# Patient Record
Sex: FEMALE | Race: WHITE | NOT HISPANIC OR LATINO | Employment: OTHER | URBAN - METROPOLITAN AREA
[De-identification: names, ages, dates, MRNs, and addresses within clinical notes are randomized per-mention and may not be internally consistent; named-entity substitution may affect disease eponyms.]

---

## 2017-02-13 ENCOUNTER — DOCUMENTATION ONLY (OUTPATIENT)
Dept: FAMILY MEDICINE | Facility: CLINIC | Age: 77
End: 2017-02-13

## 2017-02-13 NOTE — PROGRESS NOTES
Pre-Visit Chart Review  For Appointment Scheduled on 2/16/17.    Health Maintenance Due   Topic Date Due    Eye Exam  02/03/1950    TETANUS VACCINE  02/03/1960    DEXA SCAN  02/03/1982    Hemoglobin A1c  03/11/2010    Lipid Panel  07/30/2011    Influenza Vaccine  08/01/2016

## 2017-02-16 ENCOUNTER — LAB VISIT (OUTPATIENT)
Dept: LAB | Facility: HOSPITAL | Age: 77
End: 2017-02-16
Attending: FAMILY MEDICINE
Payer: MEDICARE

## 2017-02-16 ENCOUNTER — OFFICE VISIT (OUTPATIENT)
Dept: FAMILY MEDICINE | Facility: CLINIC | Age: 77
End: 2017-02-16
Payer: MEDICARE

## 2017-02-16 VITALS
BODY MASS INDEX: 34.17 KG/M2 | SYSTOLIC BLOOD PRESSURE: 144 MMHG | DIASTOLIC BLOOD PRESSURE: 88 MMHG | HEART RATE: 87 BPM | HEIGHT: 61 IN | WEIGHT: 181 LBS | TEMPERATURE: 98 F

## 2017-02-16 DIAGNOSIS — E11.59 HYPERTENSION ASSOCIATED WITH DIABETES: ICD-10-CM

## 2017-02-16 DIAGNOSIS — E11.69 HYPERLIPIDEMIA ASSOCIATED WITH TYPE 2 DIABETES MELLITUS: ICD-10-CM

## 2017-02-16 DIAGNOSIS — E66.9 OBESITY, CLASS II, BMI 35-39.9: ICD-10-CM

## 2017-02-16 DIAGNOSIS — E78.5 HYPERLIPIDEMIA ASSOCIATED WITH TYPE 2 DIABETES MELLITUS: ICD-10-CM

## 2017-02-16 DIAGNOSIS — I15.2 HYPERTENSION ASSOCIATED WITH DIABETES: ICD-10-CM

## 2017-02-16 DIAGNOSIS — E11.42 TYPE 2 DIABETES MELLITUS WITH POLYNEUROPATHY: Primary | ICD-10-CM

## 2017-02-16 DIAGNOSIS — G45.9 TRANSIENT CEREBRAL ISCHEMIA, UNSPECIFIED TYPE: ICD-10-CM

## 2017-02-16 PROCEDURE — 99999 PR PBB SHADOW E&M-EST. PATIENT-LVL V: CPT | Mod: PBBFAC,,, | Performed by: FAMILY MEDICINE

## 2017-02-16 PROCEDURE — 99214 OFFICE O/P EST MOD 30 MIN: CPT | Mod: S$PBB,,, | Performed by: FAMILY MEDICINE

## 2017-02-16 PROCEDURE — 36415 COLL VENOUS BLD VENIPUNCTURE: CPT | Mod: PO

## 2017-02-16 PROCEDURE — 83036 HEMOGLOBIN GLYCOSYLATED A1C: CPT

## 2017-02-16 RX ORDER — CARVEDILOL 12.5 MG/1
12.5 TABLET ORAL 2 TIMES DAILY WITH MEALS
COMMUNITY

## 2017-02-16 RX ORDER — AMLODIPINE BESYLATE 5 MG/1
5 TABLET ORAL DAILY
COMMUNITY

## 2017-02-16 RX ORDER — PRENATAL VIT 75/IRON/FOLIC/OM3 28-800-440
1 COMBINATION PACKAGE (EA) ORAL DAILY
COMMUNITY

## 2017-02-16 NOTE — PROGRESS NOTES
CHIEF COMPLAINT:  Follow up    HISTORY OF PRESENT ILLNESS:  Jonny Martinez is a 77 y.o. female who presents to clinic for follow up.  She is also established with Dr. Mcdermott, Dr. Arthur, Dr. Hart, and Dr. Larkin.    1. Type 2 DM:  She is on metformin, ASA. A recent HgA1c was 6.9%. She is not on a statin, but takes fish oil capsules. A urine microalbumin/cr rati was elevated.  She cannot be on an ACE-I, ARB due to hyperkalemia. She requests referral to diabetic education to discuss diabetic diet in the setting of hyperkalemia and GI issues. She states that her fasting blood sugars have been in the 110s-160s, the majority have been in the 130-140s.    2. HTN:  Established with Dr. Arthur.  She is on HCTZ, coreg, norvasc and prn catapres. She denies any CP, SOB, edema. She is following up with Dr. Arthur on monday      REVIEW OF SYSTEMS:  The patient denies any fever, chills, night sweats, headaches, vision changes, difficulty speaking or swallowing, decreased hearing, weight loss, weight gain, chest pain, palpitations, shortness of breath, cough, nausea, vomiting, abdominal pain, dysuria, diarrhea, constipation, hematuria, hematochezia, melena, changes in her hair, skin, nails, numbness or weakness in her extremities, erythema, pain or swelling over any of her joints, myalgia, swollen glands, easy bruising, fatigue, edema, symptoms of anxiety or depression. She denies any vaginal discharge, breast masses, nipple discharge, change in the skin overlying her breasts.      MEDICATIONS:   Reviewed and/or reconciled in EPIC    ALLERGIES:  Reviewed and/or reconciled in [facility]    PAST MEDICAL/SURGICAL HISTORY:   Past Medical History   Diagnosis Date    Arthritis     Diabetes mellitus, type 2     Diverticula of colon     Fibromyalgia     GERD (gastroesophageal reflux disease)     High cholesterol     IBS (irritable bowel syndrome)       Past Surgical History   Procedure Laterality Date    Hysterectomy    "   Cholecystectomy      Appendectomy      Anal fissure repair      Tonsillectomy         FAMILY HISTORY:    Family History   Problem Relation Age of Onset    Aortic aneurysm Father     Cancer Mother      cervical    Diabetes Maternal Uncle     Allergic rhinitis Neg Hx     Angioedema Neg Hx     Asthma Neg Hx     Atopy Neg Hx     Eczema Neg Hx     Immunodeficiency Neg Hx     Urticaria Neg Hx     Breast cancer Neg Hx     Colon cancer Neg Hx     Ovarian cancer Neg Hx     Hypertension Neg Hx        SOCIAL HISTORY:    Social History     Social History    Marital status:      Spouse name: N/A    Number of children: N/A    Years of education: N/A     Occupational History    Not on file.     Social History Main Topics    Smoking status: Former Smoker     Packs/day: 0.20     Years: 5.00     Types: Cigarettes    Smokeless tobacco: Never Used      Comment: smoked 1 pack per week for about 5 years in 20s    Alcohol use No    Drug use: No    Sexual activity: Not Currently     Partners: Male     Other Topics Concern    Not on file     Social History Narrative       PHYSICAL EXAM:  VITAL SIGNS:   Vitals:    02/16/17 1437 02/16/17 1453   BP: (!) 144/82 (!) 144/88   BP Location: Left arm Right arm   Patient Position: Sitting Sitting   BP Method: Automatic Manual   Pulse: 87    Temp: 98.3 °F (36.8 °C)    TempSrc: Oral    Weight: 82.1 kg (181 lb)    Height: 5' 1" (1.549 m)      GENERAL:  Patient appears well nourished, sitting on exam table, in no acute distress.  HEENT:  Atraumatic, normocephalic, PERRLA, EOMI, no conjunctival injection, sclerae are anicteric, normal external auditory canals,TMs clear b/l, gross hearing intact to whisper, MMM, no oropharygneal erythema or exudate.  NECK:  Supple, normal ROM, trachea is midline , no supraclavicular or cervical LAD or masses palpated.  Thyroid gland not palpable.  CARDIOVASCULAR:  RRR, normal S1 and S2, no m/r/g.  RESPIRATORY:  CTA b/l, no wheezes, " rhonchi, rales.  No increased work of breathing, no  use of accessory muscles.  ABDOMEN:  Soft, nontender, nondistended, normoactive bowel sounds in all four quadrants, no rebound or guarding, no HSM or masses palpated.  Normal percussion.  EXTREMITIES:  2+ DP pulses b/l, no edema.  SKIN:  Warm, no lesions on exposed skin.  NEUROMUSCULAR:  Cranial nerves II-XII grossly intact. No clubbing or cyanosis of digits/nails.  Steady gait.  PSYCH:  Patient is alert and oriented to person, time, place. They are appropriately dressed and groomed. There is normal eye contact. Rate and tone of speech is normal. Normal insight, judgement. Normal thought content and process.     LABORATORY/IMAGING STUDIES: pending    ASSESSMENT/PLAN: This is a 77 y.o. female who presents to clinic for evaluation of the following concerns  1. Type  2 DM: see below  2. HTN: see below  3. Obesity: see below        Patient readiness: acceptance and barriers:none    During the course of the visit the patient was educated and counseled about the following:     Diabetes:  HgA1c, refer to diabetic education.  Hypertension:   Medication: no change. follow up with Dr. Arthur next week.   Obesity:   General weight loss/lifestyle modification strategies discussed (elicit support from others; identify saboteurs; non-food rewards, etc).  Diet interventions: moderate (500 kCal/d) deficit diet.  Informal exercise measures discussed, e.g. taking stairs instead of elevator.    Goals: Diabetes: Maintain Hemoglobin A1C below 7, Hypertension: Reduce Blood Pressure and Obesity: Reduce calorie intake and BMI    Did patient meet goals/outcomes: No    The following self management tools provided: declined    Patient Instructions (the written plan) was given to the patient/family.     Time spent with patient: 55 minutes      Adriana Izaguirre MD

## 2017-02-16 NOTE — MR AVS SNAPSHOT
[facility]  [address]  [place] [address]  Phone: [phone]  Fax: [phone]                  Jonny Martinez   2017 2:40 PM   Office Visit    Description:  Female : 1940   Provider:  Adriana Izaguirre MD   Department:  [facility]           Reason for Visit     Follow-up           Diagnoses this Visit        Comments    Type 2 diabetes mellitus with polyneuropathy    -  Primary     Hypertension associated with diabetes         Hyperlipidemia associated with type 2 diabetes mellitus         Obesity, Class II, BMI 35-39.9         Transient cerebral ischemia, unspecified type                To Do List           Future Appointments        Provider Department Dept Phone    2017 4:00 PM [facility] [place] [phone]    3/16/2017 2:00 PM [facility] [place] [facility] [phone]    2017 1:00 PM Adriana Izaguirre MD [facility] [phone]      Goals (5 Years of Data)     None      Follow-Up and Disposition     Return in about 6 months (around 2017) for type 2 DM.      [facility] On Call     [facility] On Call Nurse Care Line -  Assistance  Registered nurses in the [facility] On Call Center provide clinical advisement, health education, appointment booking, and other advisory services.  Call for this free service at [phone].             Medications           Message regarding Medications     Verify the changes and/or additions to your medication regime listed below are the same as discussed with your clinician today.  If any of these changes or additions are incorrect, please notify your healthcare provider.        STOP taking these medications     multivitamin (ONE DAILY MULTIVITAMIN) per tablet Take 1 tablet by mouth once daily.           Verify that the below list of medications is an accurate representation of the medications you are currently taking.  If none reported, the list  may be blank. If incorrect, please contact your healthcare provider. Carry this list with you in case of emergency.           Current Medications     acai berry extract 500 mg Cap Take 1 capsule by mouth once daily.    amlodipine (NORVASC) 5 MG tablet Take 5 mg by mouth once daily.    ascorbic acid (VITAMIN C) 1000 MG tablet Take 1,000 mg by mouth once daily.    aspirin (ECOTRIN) 81 MG EC tablet Take 81 mg by mouth once daily.    CALCIUM-MAGNESIUM ORAL Take 1 tablet by mouth once daily.    carvedilol (COREG) 12.5 MG tablet Take 12.5 mg by mouth 2 (two) times daily with meals.    cholecalciferol, vitamin D3, 5,000 unit Tab Take 5,000 Units by mouth once daily.    cloNIDine (CATAPRES) 0.1 MG tablet Take 1 tablet every 12 hours as needed     coenzyme Q10 10 mg capsule Take 10 mg by mouth once daily.    dicyclomine (BENTYL) 20 mg tablet Four times a day    FREESTYLE LANCETS 28 gauge lancets     GRAPE SEED EXTRACT (GRAPE SEED ORAL) Take 325 mg by mouth once daily.    GREEN TEA EXTRACT ORAL Take 1 tablet by mouth once daily.    hydrochlorothiazide (HYDRODIURIL) 25 MG tablet Take 25 mg by mouth once daily.    hydrocodone-acetaminophen 10-325mg (NORCO)  mg Tab Take by mouth once daily. Take 1/2 to 1 tablet daily if needed.     LACTOBACILLUS ACIDOPHILUS (PROBIOTIC ORAL) Take 1 capsule by mouth once daily.    metformin (GLUCOPHAGE) 1000 MG tablet Take 1,000 mg by mouth 2 (two) times daily with meals.     milk thistle 200 mg Cap Take 1 capsule by mouth once daily.    olive leaf extract 250 mg Cap Take 1 capsule by mouth once daily.    OMEGA-3 FATTY ACIDS/FISH OIL (OMEGA 3 FISH OIL ORAL) No Sig Provided    ONE TOUCH ULTRA TEST Strp     pantoprazole (PROTONIX) 40 MG tablet Take 40 mg by mouth once daily.     RESTASIS 0.05 % ophthalmic emulsion     resveratrol 250 mg Cap Take 1 capsule by mouth once daily.           Clinical Reference Information           Your Vitals Were     BP Pulse Temp Height Weight BMI    144/88 (BP  "Location: Right arm, Patient Position: Sitting, BP Method: Manual) 87 98.3 °F (36.8 °C) (Oral) 5' 1" (1.549 m) 82.1 kg (181 lb) 34.2 kg/m2      Blood Pressure          Most Recent Value    BP  (!)  144/88      Allergies as of 2/16/2017     Losartan    Monosodium Glutamate    Niacin    Penicillins      Immunizations Administered on Date of Encounter - 2/16/2017     None      Orders Placed During Today's Visit      Normal Orders This Visit    Ambulatory consult to Diabetic Education     Future Labs/Procedures Expected by Expires    Hemoglobin A1c  2/16/2017 4/17/2018      Language Assistance Services     ATTENTION: Language assistance services are available, free of charge. Please call [phone].      ATENCIÓN: Si royce nichols, tiene a barkley disposición servicios gratuitos de asistencia lingüística. Llame al [phone].     CHÚ Ý: N?u b?n nói Ti?ng Vi?t, có các d?ch v? h? tr? ngôn ng? mi?n phí dành cho b?n. G?i s? [phone].         [facility] complies with applicable Federal civil rights laws and does not discriminate on the basis of race, color, national origin, age, disability, or sex.        "

## 2017-02-17 DIAGNOSIS — E11.9 TYPE 2 DIABETES MELLITUS WITHOUT COMPLICATION: ICD-10-CM

## 2017-02-17 LAB
ESTIMATED AVG GLUCOSE: 143 MG/DL
HBA1C MFR BLD HPLC: 6.6 %

## 2017-03-16 ENCOUNTER — CLINICAL SUPPORT (OUTPATIENT)
Dept: DIABETES | Facility: CLINIC | Age: 77
End: 2017-03-16
Payer: MEDICARE

## 2017-03-16 DIAGNOSIS — E11.42 TYPE 2 DIABETES MELLITUS WITH POLYNEUROPATHY: ICD-10-CM

## 2017-03-16 PROCEDURE — G0108 DIAB MANAGE TRN  PER INDIV: HCPCS | Mod: PBBFAC,PO | Performed by: DIETITIAN, REGISTERED

## 2017-03-16 PROCEDURE — 99999 PR PBB SHADOW E&M-EST. PATIENT-LVL III: CPT | Mod: PBBFAC,,,

## 2017-03-16 PROCEDURE — 99213 OFFICE O/P EST LOW 20 MIN: CPT | Mod: PBBFAC,PO

## 2017-03-20 VITALS — BODY MASS INDEX: 34.17 KG/M2 | WEIGHT: 181 LBS | HEIGHT: 61 IN

## 2017-03-20 NOTE — PROGRESS NOTES
03/16/17 0000   Diabetes Education Visit   Diabetes Education Record Assessment/Progress Initial   Diabetes Type   Diabetes Type  Type II   Diabetes History   Diabetes Diagnosis 1-3 years   Nutrition   Meal Planning snacks between meal;skipping meals;water   Meal Plan 24 Hour Recall - Breakfast (eggs and toast or eggs and grits )   Meal Plan 24 Hour Recall - Lunch (Waits till hungry to eat and will eat whatever she may have in house)   Meal Plan 24 Hour Recall - Dinner (Last night neighbor brought leftovers but she could not remember what she had)   Monitoring    Monitoring Free Lite   Self Monitoring  (Reports blood sugar fasting range )   Blood Glucose Logs No   Exercise    Exercise Type (No routine exercise)   Current Diabetes Treatment    Current Treatment Oral Medication  (1000 mg Metformin BID)   Social History   Preferred Learning Method Face to Face   Primary Support Self;Family   Educational Level College Graduate   Smoking Status Ex Smoker   Alcohol Use Never   Barriers to Change   Barriers to Change None   Learning Challenges  None   Readiness to Learn    Readiness to Learn  Acceptance   Diabetes Education Assessment/Progress   Acute Complications (preventing, detecting, and treating acute complications) DC   Chronic Complications (preventing, detecting, and treating chronic complications) DC   Diabetes Disease Process (diabetes disease process and treatment options) DC   Nutrition (Incorporating nutritional management into one's lifestyle) DC;W Educated patient on plate method with carb limit set to 30 grams per meal and 15 grams or less per snack.  Educated patient on how to read nutrition labels for carb, protein, fiber and fat content.  Provided Carb counting and meal planning book for reference. Instructed patient on the importance of eating three times per day.   Physical Activity (incorporating physical activity into one's lifestyle) DC   Medications (states correct name, dose, onset,  peak, duration, side effects & timing of meds) DC   Monitoring (monitoring blood glucose/other parameters & using results) DC   Goal Setting and Problem Solving (verbalizes behavior change strategies & sets realistic goals) DC   Behavior Change (developing personal strategies to health & behavior change) DC   Psychosocial Issues (developing personal srategies to address psychosocial concerns) DC   Goals   Healthy Eating Set   Start Date 03/16/17   Monitoring In Progress   Medications In Progress   Problem Solving In Progress   Healthy Coping In Progress   Reducing Risks In Progress   Diabetes Self-Management Support Plan   Exercise/Nutrition (Carb couting and meal planning book provided)   Stress Management friends   Review Status Patient has selected and agrees to support plan.   Diabetes Care Plan/Intervention   Education Plan/Intervention In F/U DSMT   Diabetes Meal Plan   Calories 1400   Carbohydrate Per Meal 30-45g   Carbohydrate Per Snack  7-15g   Protein (lean protein with meals)   Education Units of Time    Time Spent 60 min

## 2017-07-01 LAB
BASOPHILS # BLD AUTO: 77 CELLS/UL (ref 0–200)
BASOPHILS NFR BLD AUTO: 0.5 %
EOSINOPHIL # BLD AUTO: 413 CELLS/UL (ref 15–500)
EOSINOPHIL NFR BLD AUTO: 2.7 %
ERYTHROCYTE [DISTWIDTH] IN BLOOD BY AUTOMATED COUNT: 20.5 % (ref 11–15)
HCT VFR BLD AUTO: 48.7 % (ref 35–45)
HGB BLD-MCNC: 14.6 G/DL (ref 11.7–15.5)
LYMPHOCYTES # BLD AUTO: 2020 CELLS/UL (ref 850–3900)
LYMPHOCYTES NFR BLD AUTO: 13.2 %
MCH RBC QN AUTO: 23.1 PG (ref 27–33)
MCHC RBC AUTO-ENTMCNC: 30.1 G/DL (ref 32–36)
MCV RBC AUTO: 76.7 FL (ref 80–100)
MONOCYTES # BLD AUTO: 551 CELLS/UL (ref 200–950)
MONOCYTES NFR BLD AUTO: 3.6 %
NEUTROPHILS # BLD AUTO: ABNORMAL CELLS/UL (ref 1500–7800)
NEUTROPHILS NFR BLD AUTO: 80 %
PLATELET # BLD AUTO: 728 THOUSAND/UL (ref 140–400)
PMV BLD REES-ECKER: 8.5 FL (ref 7.5–12.5)
RBC # BLD AUTO: 6.34 MILLION/UL (ref 3.8–5.1)
WBC # BLD AUTO: 15.3 THOUSAND/UL (ref 3.8–10.8)

## 2017-07-12 ENCOUNTER — TELEPHONE (OUTPATIENT)
Dept: OBSTETRICS AND GYNECOLOGY | Facility: CLINIC | Age: 77
End: 2017-07-12

## 2017-07-12 NOTE — TELEPHONE ENCOUNTER
----- Message from Genesis Mckenzie sent at 7/11/2017  4:49 PM CDT -----  Contact: Jonny  Patient is scheduled for WWE 11/6/17 but as Medicare stipulates every two years but states Dr Jones told her she'd see her in a year. States has history in family of ovarian cancer. Please call [phone] or can send message via [employer] to let know either way about appointment. Thanks!

## 2017-07-12 NOTE — TELEPHONE ENCOUNTER
Spoke with patient and informed her that she is not due until 11/2018 for a WWE. She denies any personal history of cancer, had a hysterectomy due to fibroids, and is not on any hormonal supplements. She was informed that she does not meet criteria for annual visits, and she may be billed the full amount of the visit, She wants to keep her appt as scheduled. She was informed that she will need to sign an ABN at the time of her visit, should she choose to keep it. She verbalized understanding./zoë

## 2017-07-13 DIAGNOSIS — M79.641 BILATERAL HAND PAIN: Primary | ICD-10-CM

## 2017-07-13 DIAGNOSIS — M79.642 BILATERAL HAND PAIN: Primary | ICD-10-CM

## 2017-07-14 ENCOUNTER — TELEPHONE (OUTPATIENT)
Dept: ORTHOPEDICS | Facility: CLINIC | Age: 77
End: 2017-07-14

## 2017-07-14 NOTE — TELEPHONE ENCOUNTER
Returned pt's call, she was curious about if she should see ortho first or rheumatologist.  Advised that it was her choice.  She stated that she will keep scheduled appt next week.

## 2017-07-14 NOTE — TELEPHONE ENCOUNTER
----- Message from Kathryn Wilks sent at 7/14/2017 12:56 PM CDT -----  Contact: self   [phone]  Patient called asking is she should see the orthopedic doctor first for bilateral hand pain. Or should the patient see the rheumatologist first. Thanks!

## 2017-07-18 ENCOUNTER — OFFICE VISIT (OUTPATIENT)
Dept: ORTHOPEDICS | Facility: CLINIC | Age: 77
End: 2017-07-18
Payer: MEDICARE

## 2017-07-18 ENCOUNTER — HOSPITAL ENCOUNTER (OUTPATIENT)
Dept: RADIOLOGY | Facility: HOSPITAL | Age: 77
Discharge: HOME | End: 2017-07-18
Attending: ORTHOPAEDIC SURGERY
Payer: MEDICARE

## 2017-07-18 VITALS
HEART RATE: 82 BPM | WEIGHT: 181 LBS | BODY MASS INDEX: 34.17 KG/M2 | SYSTOLIC BLOOD PRESSURE: 132 MMHG | HEIGHT: 61 IN | DIASTOLIC BLOOD PRESSURE: 69 MMHG

## 2017-07-18 DIAGNOSIS — M18.0 PRIMARY OSTEOARTHRITIS OF BOTH FIRST CARPOMETACARPAL JOINTS: Primary | ICD-10-CM

## 2017-07-18 DIAGNOSIS — M79.642 BILATERAL HAND PAIN: ICD-10-CM

## 2017-07-18 DIAGNOSIS — M79.641 BILATERAL HAND PAIN: ICD-10-CM

## 2017-07-18 PROCEDURE — 1159F MED LIST DOCD IN RCRD: CPT | Mod: ,,, | Performed by: ORTHOPAEDIC SURGERY

## 2017-07-18 PROCEDURE — 73130 X-RAY EXAM OF HAND: CPT | Mod: 26,50,, | Performed by: RADIOLOGY

## 2017-07-18 PROCEDURE — 99999 PR PBB SHADOW E&M-EST. PATIENT-LVL III: CPT | Mod: PBBFAC,,, | Performed by: ORTHOPAEDIC SURGERY

## 2017-07-18 PROCEDURE — 1125F AMNT PAIN NOTED PAIN PRSNT: CPT | Mod: ,,, | Performed by: ORTHOPAEDIC SURGERY

## 2017-07-18 PROCEDURE — 73130 X-RAY EXAM OF HAND: CPT | Mod: 50,TC,PN

## 2017-07-18 PROCEDURE — 99203 OFFICE O/P NEW LOW 30 MIN: CPT | Mod: S$PBB,,, | Performed by: ORTHOPAEDIC SURGERY

## 2017-07-18 NOTE — PROGRESS NOTES
Past Medical History:   Diagnosis Date    Arthritis     Diabetes mellitus, type 2     Diverticula of colon     Fibromyalgia     GERD (gastroesophageal reflux disease)     High cholesterol     IBS (irritable bowel syndrome)        Past Surgical History:   Procedure Laterality Date    anal fissure repair      APPENDECTOMY      CHOLECYSTECTOMY      HYSTERECTOMY      TONSILLECTOMY         Current Outpatient Prescriptions   Medication Sig    acai berry extract 500 mg Cap Take 1 capsule by mouth once daily.    amlodipine (NORVASC) 5 MG tablet Take 5 mg by mouth once daily.    ascorbic acid (VITAMIN C) 1000 MG tablet Take 1,000 mg by mouth once daily.    aspirin (ECOTRIN) 81 MG EC tablet Take 81 mg by mouth once daily.    carvedilol (COREG) 12.5 MG tablet Take 12.5 mg by mouth 2 (two) times daily with meals.    cholecalciferol, vitamin D3, 5,000 unit Tab Take 5,000 Units by mouth once daily.    cloNIDine (CATAPRES) 0.1 MG tablet Take 1 tablet every 12 hours as needed     coenzyme Q10 10 mg capsule Take 10 mg by mouth once daily.    dicyclomine (BENTYL) 20 mg tablet Four times a day    FREESTYLE LANCETS 28 gauge lancets     GRAPE SEED EXTRACT (GRAPE SEED ORAL) Take 325 mg by mouth once daily.    GREEN TEA EXTRACT ORAL Take 1 tablet by mouth once daily.    hydrochlorothiazide (HYDRODIURIL) 25 MG tablet Take 25 mg by mouth once daily.    hydrocodone-acetaminophen 10-325mg (NORCO)  mg Tab Take by mouth once daily. Take 1/2 to 1 tablet daily if needed.     LACTOBACILLUS ACIDOPHILUS (PROBIOTIC ORAL) Take 1 capsule by mouth once daily.    metformin (GLUCOPHAGE) 1000 MG tablet Take 1,000 mg by mouth 2 (two) times daily with meals.     milk thistle 200 mg Cap Take 1 capsule by mouth once daily.    olive leaf extract 250 mg Cap Take 1 capsule by mouth once daily.    OMEGA-3 FATTY ACIDS/FISH OIL (OMEGA 3 FISH OIL ORAL) No Sig Provided    ONE TOUCH ULTRA TEST Strp     pantoprazole (PROTONIX) 40  "MG tablet Take 40 mg by mouth once daily.     resveratrol 250 mg Cap Take 1 capsule by mouth once daily.    CALCIUM-MAGNESIUM ORAL Take 1 tablet by mouth once daily.    RESTASIS 0.05 % ophthalmic emulsion      No current facility-administered medications for this visit.        Review of patient's allergies indicates:   Allergen Reactions    Losartan      Made patient "feel weird"    Monosodium glutamate      Other reaction(s): flushing skin  Other reaction(s): elev b/p    Niacin      Other reaction(s): flushing skin  Other reaction(s): elev b/p    Penicillins      Other reaction(s): whelps       Family History   Problem Relation Age of Onset    Aortic aneurysm Father     Cancer Mother      cervical    Diabetes Maternal Uncle     Allergic rhinitis Neg Hx     Angioedema Neg Hx     Asthma Neg Hx     Atopy Neg Hx     Eczema Neg Hx     Immunodeficiency Neg Hx     Urticaria Neg Hx     Breast cancer Neg Hx     Colon cancer Neg Hx     Ovarian cancer Neg Hx     Hypertension Neg Hx        Social History     Social History    Marital status:      Spouse name: N/A    Number of children: N/A    Years of education: N/A     Occupational History    Not on file.     Social History Main Topics    Smoking status: Former Smoker     Packs/day: 0.20     Years: 5.00     Types: Cigarettes    Smokeless tobacco: Never Used      Comment: smoked 1 pack per week for about 5 years in 20s    Alcohol use No    Drug use: No    Sexual activity: Not Currently     Partners: Male     Other Topics Concern    Not on file     Social History Narrative    No narrative on file       Chief Complaint:   Chief Complaint   Patient presents with    Hand Pain     BILATERAL HAND PAIN       Consulting Physician: No ref. provider found    History of present illness:    This is a 77 y.o. year old female who complains of lateral thumb pain that she's had for years.  She has previously been seen by rheumatologist.  She puts her " "pain at a 5 out of 10 and worse with activities.  She has trouble gripping and driving.    Review of Systems:    Constitution: Denies chills, fever, and sweats.  HENT: Denies headaches or blurry vision.  Cardiovascular: Denies chest pain or irregular heart beat.  Respiratory: Denies cough or shortness of breath.  Gastrointestinal: Denies abdominal pain, nausea, or vomiting.  Musculoskeletal:  Denies muscle cramps.  Neurological: Denies dizziness or focal weakness.  Psychiatric/Behavioral: Normal mental status.  Hematologic/Lymphatic: Denies bleeding problem or easy bruising/bleeding.  Skin: Denies rash or suspicious lesions.    Examination:    Vital Signs:    Vitals:    07/18/17 1347   BP: 132/69   Pulse: 82   Weight: 82.1 kg (181 lb)   Height: 5' 1" (1.549 m)   PainSc:   5   PainLoc: Hand       Body mass index is 34.2 kg/m².    This a well-developed, well nourished patient in no acute distress.    Alert and oriented and cooperative to examination.       Physical Exam: Right Hand Exam    Skin  Scars:   None  Rash:   None    Inspection  Erythema:  None  Bruising:  None  Swelling:  None  Masses:  None  Lymphadenopathy: None    Coordination:  Normal  Instability:  None    Range of Motion  Finger ROM:  Full    Strength:  Normal   Tenderness:  CMC thumb    Pulse:   2+ radial  Capillary Refill: Normal    Sensation:  Intact      Left Hand Exam    Skin  Scars:   None  Rash:   None    Inspection  Erythema:  None  Bruising:  None  Swelling:  None  Masses:  None  Lymphadenopathy: None    Coordination:  Normal  Instability:  None    Range of Motion  Finger ROM:  Full    Strength:  Normal   Tenderness:  CMC thumb    Pulse:   2+ radial  Capillary Refill: Normal    Sensation:  Intact          Imaging: X-rays ordered and reviewed today both hands reveal degenerative changes throughout the hands with severe arthritis at the CMC joint of both thumbs.        Assessment: Primary osteoarthritis of both first carpometacarpal " joints        Plan:  We discussed treatment options with her today.  She is not interested in an injection.  She has an appointment with a rheumatologist next week.  We did give her night splints to try to give her some pain relief.  We also set her up with a referral to see our hand surgeon for possible CMC arthroplasty.      DISCLAIMER: This note may have been dictated using voice recognition software and may contain grammatical errors.     NOTE: Consult report sent to referring provider via [email or web address] EMR.

## 2017-08-07 ENCOUNTER — OFFICE VISIT (OUTPATIENT)
Dept: PODIATRY | Facility: CLINIC | Age: 77
End: 2017-08-07
Payer: MEDICARE

## 2017-08-07 VITALS — WEIGHT: 180.75 LBS | HEIGHT: 61 IN | BODY MASS INDEX: 34.13 KG/M2

## 2017-08-07 DIAGNOSIS — Z01.818 PRE-OP TESTING: ICD-10-CM

## 2017-08-07 DIAGNOSIS — E11.42 DIABETIC POLYNEUROPATHY ASSOCIATED WITH TYPE 2 DIABETES MELLITUS: Primary | ICD-10-CM

## 2017-08-07 DIAGNOSIS — B35.1 ONYCHOMYCOSIS DUE TO DERMATOPHYTE: ICD-10-CM

## 2017-08-07 DIAGNOSIS — L60.0 INGROWN NAIL: ICD-10-CM

## 2017-08-07 DIAGNOSIS — M79.674 TOE PAIN, BILATERAL: ICD-10-CM

## 2017-08-07 DIAGNOSIS — M79.675 TOE PAIN, BILATERAL: ICD-10-CM

## 2017-08-07 PROCEDURE — 1159F MED LIST DOCD IN RCRD: CPT | Mod: ,,, | Performed by: PODIATRIST

## 2017-08-07 PROCEDURE — 99213 OFFICE O/P EST LOW 20 MIN: CPT | Mod: PBBFAC,PO | Performed by: PODIATRIST

## 2017-08-07 PROCEDURE — 99999 PR PBB SHADOW E&M-EST. PATIENT-LVL III: CPT | Mod: PBBFAC,,, | Performed by: PODIATRIST

## 2017-08-07 PROCEDURE — 3044F HG A1C LEVEL LT 7.0%: CPT | Mod: ,,, | Performed by: PODIATRIST

## 2017-08-07 PROCEDURE — 1125F AMNT PAIN NOTED PAIN PRSNT: CPT | Mod: ,,, | Performed by: PODIATRIST

## 2017-08-07 PROCEDURE — 99213 OFFICE O/P EST LOW 20 MIN: CPT | Mod: S$PBB,,, | Performed by: PODIATRIST

## 2017-08-07 NOTE — PROGRESS NOTES
Subjective:      Patient ID: Jonny Martinez is a 77 y.o. female.    Chief Complaint: Diabetic Foot Exam and Nail Problem    Sharp pain both sides and proximal fold both hallux toenails.  Gradual onset, worsening over past several weeks, aggravated by increased weight bearing, shoe gear, pressure.  No previous medical treatment.  OTC pain med not helping.  Denies signs infection and recent trauma both big toes.    Diabetes, increased risk amputation needing evaluation/management/optomization of foot care.       Review of Systems   Constitution: Negative for chills, diaphoresis, fever, malaise/fatigue and night sweats.   Cardiovascular: Negative for claudication, cyanosis, leg swelling and syncope.   Skin: Positive for nail changes. Negative for color change, dry skin, rash, suspicious lesions and unusual hair distribution.   Musculoskeletal: Negative for falls, joint pain, joint swelling, muscle cramps, muscle weakness and stiffness.   Gastrointestinal: Negative for constipation, diarrhea, nausea and vomiting.   Neurological: Negative for brief paralysis, disturbances in coordination, focal weakness, numbness, paresthesias, sensory change and tremors.           Objective:      Physical Exam   Constitutional: She is oriented to person, place, and time. She appears well-developed and well-nourished. She is cooperative.   Oriented to time, place, and person.   Cardiovascular:   Pulses:       Dorsalis pedis pulses are 1+ on the right side, and 1+ on the left side.        Posterior tibial pulses are 1+ on the right side, and 1+ on the left side.   Capillary fill time 3-5 seconds.  All toes warm to touch.      Negative lower extremity edema bilateral.    Negative elevational pallor and dependent rubor bilateral.     Musculoskeletal:   Normal angle, base, station of gait. Decreased stride length, early heel off, moderately propulsive toe off bilateral.    All ten toes without clubbing, cyanosis, or signs of ischemia.       No pain to palpation bilateral lower extremities.      Range of motion, stability, muscle strength, and muscle tone are age and health appropriate normal bilateral feet and legs.       Lymphadenopathy:   Negative lymphadenopathy bilateral popliteal fossa and tarsal tunnel.     Neurological: She is alert and oriented to person, place, and time. She has normal strength. She is not disoriented. She displays no atrophy and no tremor. A sensory deficit is present. She exhibits normal muscle tone.   Reflex Scores:       Patellar reflexes are 2+ on the right side and 2+ on the left side.       Achilles reflexes are 2+ on the right side and 2+ on the left side.  Decreased/absent vibratory sensation bilateral feet to 128Hz tuning fork.     Skin: Skin is warm, dry and intact. No abrasion, no bruising, no burn, no ecchymosis, no laceration, no lesion, no petechiae and no rash noted. She is not diaphoretic. No cyanosis or erythema. No pallor. Nails show no clubbing.   Skin thin, atrophic, with decreased density and distribution of pedal hair bilateral, but without hyperpigmentation, dheeraj discoloration,  ulcers, masses, nodules or cords palpated bilateral feet and legs.      Toenails 1st, 2nd, 3rd, 4th, 5th  bilateral are hypertrophic thickened 2-3 mm, dystrophic, discolored tanish brown with tan, gray crumbly subungual debris.  Tender to distal nail plate pressure, without periungual skin abnormality of each.    Both borders and proximal nailfold both hallux mild erythema  without ulceration, drainage, pus, tracking, fluctuance, malodor, or cardinal signs infection.               Assessment:       Encounter Diagnoses   Name Primary?    Diabetic polyneuropathy associated with type 2 diabetes mellitus Yes    Onychomycosis due to dermatophyte     Toe pain, bilateral     Pre-op testing     Ingrown nail          Plan:       Jonny was seen today for diabetic foot exam and nail problem.    Diagnoses and all orders for  this visit:    Diabetic polyneuropathy associated with type 2 diabetes mellitus  -     US Lower Extrem Arteries Bilat with KESHA; Future    Onychomycosis due to dermatophyte  -     US Lower Extrem Arteries Bilat with KESHA; Future    Toe pain, bilateral  -     US Lower Extrem Arteries Bilat with KESHA; Future    Pre-op testing  -     US Lower Extrem Arteries Bilat with KESHA; Future    Ingrown nail      I counseled the patient on her conditions, their implications and medical management.    Needs A1c, arterial dopplers.  If acceptable, rtc 2 weeks both hallux total matrixectomy.    Discussed conservative treatment with shoes of adequate dimensions, material, and style to alleviate symptoms and delay or prevent surgical intervention.    The patient has received literature on basic diabetic foot care.  Patient will inspect feet daily, wear protective shoe gear when ambulatory, and apply moisturizer to skin as needed to maintain elasticity and help prevent ulceration.            Return in about 2 weeks (around 8/21/2017) for matrixectomy total both hallux.

## 2017-08-08 ENCOUNTER — HOSPITAL ENCOUNTER (OUTPATIENT)
Dept: RADIOLOGY | Facility: CLINIC | Age: 77
Discharge: HOME | End: 2017-08-08
Attending: PODIATRIST
Payer: MEDICARE

## 2017-08-08 DIAGNOSIS — M79.675 TOE PAIN, BILATERAL: ICD-10-CM

## 2017-08-08 DIAGNOSIS — M79.674 TOE PAIN, BILATERAL: ICD-10-CM

## 2017-08-08 DIAGNOSIS — B35.1 ONYCHOMYCOSIS DUE TO DERMATOPHYTE: ICD-10-CM

## 2017-08-08 DIAGNOSIS — Z01.818 PRE-OP TESTING: ICD-10-CM

## 2017-08-08 DIAGNOSIS — E11.42 DIABETIC POLYNEUROPATHY ASSOCIATED WITH TYPE 2 DIABETES MELLITUS: ICD-10-CM

## 2017-08-08 PROCEDURE — 93925 LOWER EXTREMITY STUDY: CPT | Mod: 26,,, | Performed by: RADIOLOGY

## 2017-08-08 PROCEDURE — 93922 UPR/L XTREMITY ART 2 LEVELS: CPT | Mod: 26,,, | Performed by: RADIOLOGY

## 2017-08-08 PROCEDURE — 93925 LOWER EXTREMITY STUDY: CPT | Mod: TC,PO

## 2017-08-09 ENCOUNTER — CLINICAL SUPPORT (OUTPATIENT)
Dept: AUDIOLOGY | Facility: CLINIC | Age: 77
End: 2017-08-09
Payer: MEDICARE

## 2017-08-09 ENCOUNTER — OFFICE VISIT (OUTPATIENT)
Dept: OTOLARYNGOLOGY | Facility: CLINIC | Age: 77
End: 2017-08-09
Payer: MEDICARE

## 2017-08-09 VITALS
HEART RATE: 82 BPM | HEIGHT: 62 IN | SYSTOLIC BLOOD PRESSURE: 135 MMHG | WEIGHT: 183 LBS | DIASTOLIC BLOOD PRESSURE: 86 MMHG | BODY MASS INDEX: 33.68 KG/M2

## 2017-08-09 DIAGNOSIS — H90.3 BILATERAL SENSORINEURAL HEARING LOSS: Primary | ICD-10-CM

## 2017-08-09 DIAGNOSIS — J34.89 NASAL OBSTRUCTION: Primary | ICD-10-CM

## 2017-08-09 DIAGNOSIS — J34.3 HYPERTROPHY OF INFERIOR NASAL TURBINATE: ICD-10-CM

## 2017-08-09 DIAGNOSIS — H90.3 BILATERAL SENSORINEURAL HEARING LOSS: ICD-10-CM

## 2017-08-09 DIAGNOSIS — J34.2 NASAL SEPTAL DEVIATION: ICD-10-CM

## 2017-08-09 DIAGNOSIS — J31.0 OTHER CHRONIC RHINITIS: ICD-10-CM

## 2017-08-09 DIAGNOSIS — R42 DIZZINESS: ICD-10-CM

## 2017-08-09 DIAGNOSIS — R42 DIZZY: Primary | ICD-10-CM

## 2017-08-09 DIAGNOSIS — H93.13 BILATERAL TINNITUS: ICD-10-CM

## 2017-08-09 PROCEDURE — 92557 COMPREHENSIVE HEARING TEST: CPT | Mod: PBBFAC,PO | Performed by: AUDIOLOGIST

## 2017-08-09 PROCEDURE — 92567 TYMPANOMETRY: CPT | Mod: PBBFAC,PO | Performed by: AUDIOLOGIST

## 2017-08-09 PROCEDURE — 3008F BODY MASS INDEX DOCD: CPT | Mod: ,,, | Performed by: OTOLARYNGOLOGY

## 2017-08-09 PROCEDURE — 1159F MED LIST DOCD IN RCRD: CPT | Mod: ,,, | Performed by: OTOLARYNGOLOGY

## 2017-08-09 PROCEDURE — 1126F AMNT PAIN NOTED NONE PRSNT: CPT | Mod: ,,, | Performed by: OTOLARYNGOLOGY

## 2017-08-09 PROCEDURE — 3075F SYST BP GE 130 - 139MM HG: CPT | Mod: ,,, | Performed by: OTOLARYNGOLOGY

## 2017-08-09 PROCEDURE — 99999 PR PBB SHADOW E&M-EST. PATIENT-LVL I: CPT | Mod: PBBFAC,,,

## 2017-08-09 PROCEDURE — 99999 PR PBB SHADOW E&M-EST. PATIENT-LVL III: CPT | Mod: PBBFAC,,, | Performed by: OTOLARYNGOLOGY

## 2017-08-09 PROCEDURE — 3079F DIAST BP 80-89 MM HG: CPT | Mod: ,,, | Performed by: OTOLARYNGOLOGY

## 2017-08-09 PROCEDURE — 99211 OFF/OP EST MAY X REQ PHY/QHP: CPT | Mod: PBBFAC,PO

## 2017-08-09 PROCEDURE — 31231 NASAL ENDOSCOPY DX: CPT | Mod: S$PBB,,, | Performed by: OTOLARYNGOLOGY

## 2017-08-09 PROCEDURE — 99204 OFFICE O/P NEW MOD 45 MIN: CPT | Mod: 25,S$PBB,, | Performed by: OTOLARYNGOLOGY

## 2017-08-09 RX ORDER — FLUTICASONE PROPIONATE 50 MCG
SPRAY, SUSPENSION (ML) NASAL
Qty: 48 G | Refills: 3 | Status: SHIPPED | OUTPATIENT
Start: 2017-08-09 | End: 2017-11-07

## 2017-08-09 RX ORDER — FLUTICASONE PROPIONATE 50 MCG
2 SPRAY, SUSPENSION (ML) NASAL DAILY
Qty: 1 BOTTLE | Refills: 12 | Status: SHIPPED | OUTPATIENT
Start: 2017-08-09 | End: 2017-08-09 | Stop reason: SDUPTHER

## 2017-08-09 NOTE — PROGRESS NOTES
Francisco Trejo and audiogram performed per order from  Dr. Brennan Salter.     Jonny Martinez was seen 2017 for a Lagrange [place] and an audiological evaluation per order from Dr. Brennan Salter.     Patient complained of dizziness, hearing loss and tinnitus. She stated that her mother wore hearing aid but she is unsure of hearing loss among her other immediate family members (no longer in contact with and/or ). Pt stated that her dizziness is motion provoked and that she feels it when she looks up towards the ceiling or moves her head in different positions.  She sleeps on her sofa and lays at a 45 degree angle (or higher) on her left side due to problems with her breathing resulting from the high placement of her diaphragm.  She stated that she experience significant difficulty breathing when laying flat on her back.     Negative Akilah AU. (Lagrange performed in exam chair in ENT exam room with patient's consent after informing her that any difficulty in her breathing would lead to us stopping the procedure immediately.)     Audiogram results revealed a mild to moderately-severe sensorineural hearing loss bilaterally.  Speech Reception Thresholds were  40 dBHL for each ear and word recognition scores were good (88%) for each ear.   Tympanograms were Type As bilaterally.    Audiogram results were reviewed in detail with patient and all questions were answered. Results will be reviewed by ENT at the completion of this note.    Recommend binaural amplification pending medical clearance and annual audiogram to monitor hearing loss.  Pt stated that she is not interested in hearing aids at this time as she is not particularly bothered by her hearing loss. She stated that she currently uses an amplified telephone and can turn the TV up loud enough to hear comfortably.

## 2017-08-09 NOTE — PATIENT INSTRUCTIONS
Nasal steroid spray  You have been prescribed or instructed to take a nasal steroid spray.     Use as directed, spraying 1-2 times in each nostril per day.   Helpful hints for maximizing medication into the nose    - Use the opposite hand to spray the nostril (example: right hand for left nostril). This will help avoid spraying the medication onto the septum (the area that divides the left and right nasal cavity.)  - Tilt the bottle so that it is facing at a slight angle up or straight back, but avoid pointing the bottle straight up while spraying.   - Sniff in while you are spraying.    Side effects:  Overall, this is a well tolerated medication with low side effects. The benefit of nasal steroids as opposed to oral steroids is that the nasal steroid works primarily in the nose.  Common side effects: headache, nasal dryness, minor nose bleeds,   Rare side effects: septal perforation, elevated eye pressures, dry eyes, change in smell, allergic reaction.  Notify your doctor if you have any concerns or experience these symptoms.    Nasal Irrigations  This will help remove the allergens, debris, and mucous from your nose to help you breathe. It will also clear it in preparation for other nasal medications.    To perform, purchase an over the counter sinus irrigation kit such as the [employer] Sinus Rinse Kit. Use as directed on the box. You should use distilled water or water that was previously boiled and left to cool. If you wish, you may make your own solution. However, salt packets are available in the nasal section in your  drug store.     A rough estimate for making salt solution is:  8oz water  2 teaspoons salt (pickling, berry or Kosher salt)  1 teaspoon baking soda    After each use, rinse the bottle with small amount of rubbing alcohol and clean with soap.  Replace the irrigation bottle if it becomes visibly soiled or every few weeks.        You are a hearing aid candidate if you decide.   You should have a  follow-up of an appointment with an audiogram in 1 year.

## 2017-08-09 NOTE — PROGRESS NOTES
Subjective:       Patient ID: Jonny Martinez is a 77 y.o. female.    Chief Complaint: Sinus Problem and Nasal Congestion      Jonny is here for nasal obstruction. Symptoms have been present for years. Congestion mainly left sided. There has been fluctuation with season but her allergy testing was negative. Itchy eyes / watery. Nose does not run. Her main complaint is congestion. [facility] She has tried Ocean spray with no relief. She was recommended to have sinus rinses and did not buy it. She does use O2 at night for desaturations - PFTs have been normal? She does not know why she needs oxygen. Her PSG was reportedly normal. She has not tried another medication. She has difficulty laying flat.     She endorses hearing loss but has not had an audio in years. She also endorses dizziness when she looks up or lays flat. Denies nausea/vomiting.    Smoking status: Former Smoker                                                              Packs/day: 0.20      Years: 5.00         Types: Cigarettes  Smokeless tobacco: Never Used                      Comment: smoked 1 pack per week for about 5 years in            20s    Alcohol use: No                      Review of Systems   Constitutional: Negative for activity change and appetite change.   Eyes: Negative for discharge.   Respiratory: Negative for apnea.    Cardiovascular: Negative for chest pain.   Gastrointestinal: Negative for abdominal distention and abdominal pain.   Endocrine: Negative for cold intolerance and heat intolerance.   Genitourinary: Negative for dysuria.   Musculoskeletal: Negative for gait problem and joint swelling.   Skin: Negative for color change and pallor.   Neurological: Negative for syncope and weakness.   Psychiatric/Behavioral: Negative for agitation and confusion.         Objective:        Constitutional:   She is oriented to person, place, and time. She appears well-developed and  well-nourished. She appears alert. She is active. Normal speech.      Head:  Normocephalic and atraumatic. Head is without TMJ tenderness. No scars. Salivary glands normal.  Facial strength is normal.      Ears:    Right Ear: No drainage or swelling. No middle ear effusion.   Left Ear: No drainage or swelling.  No middle ear effusion.     Nose:  Mucosal edema present. No sinus tenderness. Rhinorrhea: scant  No turbinate hypertrophy.      Mouth/Throat  Oropharynx clear and moist without lesions or asymmetry, normal uvula midline and mirror exam normal. Normal dentition. No uvula swelling, lacerations or trismus. No oropharyngeal exudate. Tonsillar erythema, tonsillar exudate.      Neck:  Full range of motion with neck supple and no adenopathy. Thyroid tenderness is present. No tracheal deviation, no edema, no erythema, normal range of motion, no stridor, no crepitus and no neck rigidity present. No thyroid mass present.     Cardiovascular:   Intact distal pulses and normal pulses.      Pulmonary/Chest:   Effort normal and breath sounds normal. No stridor.     Psychiatric:   Her speech is normal and behavior is normal. Her mood appears not anxious. Her affect is not labile.     Neurological:   She is alert and oriented to person, place, and time. No sensory deficit.     Skin:   No abrasions, lacerations, lesions, or rashes. No abrasion and no bruising noted.         Tests / Results:  Audiogram Results 08/09/2017  (personally reviewed by me and discussed with patient)  Right ear  PTA:42 SRT:40 WRS:88   Left ear  PTA: 42 SRT: 40 WRS:88  Summary: flat moderate SNHL bilaterally      Tympanograms: As AU    Name: Jonny Martinez     Pre-procedure diagnosis: Nasal obstruction [J34.89]  Post-procedure diagnosis: Same    Procedure: Bilateral nasal endoscopy  Anesthesia:  2% Lidocaine and 4% Oxymetazoline topical    Procedure: Risks, benefits, and alternatives of the procedure were discussed with the patient, and consent  was obtained to perform a nasal endoscopy.  The nasal cavity was sprayed with a topical decongestant and topical anesthetic. After adequate anesthesia was obtained, the scope was passed into each nostril independently.  The nasal cavities, nasopharynx, choana, eustachian tube, fossa of Rosenmüller, and adenoids were examined with the findings described below. At the end of the examination, the scope was removed. The patient tolerated the procedure well with no complications.     Findings:  -     Nasal septum: LEFT severe deviation   -     Inferior turbinate: hypertrophy or edema (Moderate) bilaterally  -     Middle meatus: LEFT: clear   RIGHT: clear  -     Adenoids have no hypertrophy  -     There is no stenosis of the choana,  eustachian tube, or nasopharynx  -     Fossa of Rosenmüller is symmetric and contains no mass  -     Inflamed mucosa with thin stranding mucous      Assessment:       1. Nasal obstruction    2. Nasal septal deviation    3. Hypertrophy of inferior nasal turbinate          Plan:         Discussed Flonase and Nasal saline irrigations. I think this may help calm down her nose and improve some of her nasal congestion. She likely has chronic non-allergic rhinitis given her negative allergy profile in 2015.    She has a severe septal deviation and turbinate hypertrophy. She also has other medical issues including oxygen dependence at night and inability to lie flat.    If medication does not improve her symptoms, she would be a candidate for septoplasty and turbinate reduction if she wanted to be more aggressive. She would need medical clearance may need to be done at hospital given her oxygen dependence at night.      Her Bradley-Hallpike was negative for BPPV. She has a bilateral moderate SNHL, we discussed she is a candidate for hearing aids but she is deferring at this time. Recommended hearing protection and yearly audiogram.

## 2017-08-10 ENCOUNTER — TELEPHONE (OUTPATIENT)
Dept: FAMILY MEDICINE | Facility: CLINIC | Age: 77
End: 2017-08-10

## 2017-08-10 NOTE — TELEPHONE ENCOUNTER
Called pt. Need to reschedule appt with Dr. Izaguirre on 8/18/17. Dr. Izaguirre is not in clinic that day. Rescheduled to 9/20/17 with Dr. Izaguirre. Pt refused appt with ANGELLA sooner. Thanks, Carolina

## 2017-08-21 ENCOUNTER — TELEPHONE (OUTPATIENT)
Dept: PODIATRY | Facility: CLINIC | Age: 77
End: 2017-08-21

## 2017-08-21 DIAGNOSIS — E78.5 HYPERLIPIDEMIA ASSOCIATED WITH TYPE 2 DIABETES MELLITUS: ICD-10-CM

## 2017-08-21 DIAGNOSIS — E11.59 HYPERTENSION ASSOCIATED WITH DIABETES: ICD-10-CM

## 2017-08-21 DIAGNOSIS — E11.42 TYPE 2 DIABETES MELLITUS WITH POLYNEUROPATHY: Primary | ICD-10-CM

## 2017-08-21 DIAGNOSIS — I15.2 HYPERTENSION ASSOCIATED WITH DIABETES: ICD-10-CM

## 2017-08-21 DIAGNOSIS — E11.69 HYPERLIPIDEMIA ASSOCIATED WITH TYPE 2 DIABETES MELLITUS: ICD-10-CM

## 2017-08-21 DIAGNOSIS — Z01.818 PRE-OP TESTING: ICD-10-CM

## 2017-08-21 NOTE — TELEPHONE ENCOUNTER
----- Message from Cecilia Mendoza sent at 8/21/2017  3:25 PM CDT -----  Contact: Pt  Pt is requesting to have a callback from Dr. Parkinson. Pt has questions about having an order to have a A1C test done before she comes in for her appt on 08/23. Pls call pt back at [phone].

## 2017-08-21 NOTE — TELEPHONE ENCOUNTER
Spoke with Patient, Dr Izaguirre rescheduled her annual exam until 09/20/2017. She will not be getting her HGBA1c until then, do you want to order her HGBA1C. She will need it before she gets her Nail procedure done.

## 2017-08-22 NOTE — TELEPHONE ENCOUNTER
CMP, lipid panel, urine microalbumin/cr and Hga1c ordered. Need to be fasting please schedule so that Dr. Parkinson can proceed with her procedure after the Hga1c is resulted. She then needs to keep her follow up visit.

## 2017-08-22 NOTE — TELEPHONE ENCOUNTER
Spoke with  Juan, she will contact Dr Izaguirre to see about getting the HGbA1C done sooner that her next appointment so that she can get her procedure next week.

## 2017-08-24 ENCOUNTER — TELEPHONE (OUTPATIENT)
Dept: FAMILY MEDICINE | Facility: CLINIC | Age: 77
End: 2017-08-24

## 2017-08-24 NOTE — TELEPHONE ENCOUNTER
----- Message from Cherrie Baker sent at 8/24/2017  1:27 PM CDT -----  Asking for a1c test done... She is having surgery on her toes / Dr Parkinson is waiting for the results ... Call pt at [phone] (home)

## 2017-08-24 NOTE — TELEPHONE ENCOUNTER
Spoke to patient only wants A1c drawn patient declined to schedule the rest of her labs and urine.

## 2017-08-25 ENCOUNTER — LAB VISIT (OUTPATIENT)
Dept: LAB | Facility: HOSPITAL | Age: 77
End: 2017-08-25
Attending: FAMILY MEDICINE
Payer: MEDICARE

## 2017-08-25 DIAGNOSIS — I15.2 HYPERTENSION ASSOCIATED WITH DIABETES: ICD-10-CM

## 2017-08-25 DIAGNOSIS — E78.5 HYPERLIPIDEMIA ASSOCIATED WITH TYPE 2 DIABETES MELLITUS: ICD-10-CM

## 2017-08-25 DIAGNOSIS — E11.69 HYPERLIPIDEMIA ASSOCIATED WITH TYPE 2 DIABETES MELLITUS: ICD-10-CM

## 2017-08-25 DIAGNOSIS — E11.42 TYPE 2 DIABETES MELLITUS WITH POLYNEUROPATHY: ICD-10-CM

## 2017-08-25 DIAGNOSIS — E11.59 HYPERTENSION ASSOCIATED WITH DIABETES: ICD-10-CM

## 2017-08-25 LAB
ESTIMATED AVG GLUCOSE: 143 MG/DL
HBA1C MFR BLD HPLC: 6.6 %

## 2017-08-25 PROCEDURE — 36415 COLL VENOUS BLD VENIPUNCTURE: CPT | Mod: PO

## 2017-08-25 PROCEDURE — 83036 HEMOGLOBIN GLYCOSYLATED A1C: CPT

## 2017-09-01 LAB
BASOPHILS # BLD AUTO: 207 CELLS/UL (ref 0–200)
BASOPHILS NFR BLD AUTO: 1.3 %
EOSINOPHIL # BLD AUTO: 509 CELLS/UL (ref 15–500)
EOSINOPHIL NFR BLD AUTO: 3.2 %
ERYTHROCYTE [DISTWIDTH] IN BLOOD BY AUTOMATED COUNT: 19.7 % (ref 11–15)
HCT VFR BLD AUTO: 50.1 % (ref 35–45)
HGB BLD-MCNC: 15.3 G/DL (ref 11.7–15.5)
LYMPHOCYTES # BLD AUTO: 2019 CELLS/UL (ref 850–3900)
LYMPHOCYTES NFR BLD AUTO: 12.7 %
MCH RBC QN AUTO: 23.5 PG (ref 27–33)
MCHC RBC AUTO-ENTMCNC: 30.5 G/DL (ref 32–36)
MCV RBC AUTO: 77 FL (ref 80–100)
MONOCYTES # BLD AUTO: 731 CELLS/UL (ref 200–950)
MONOCYTES NFR BLD AUTO: 4.6 %
NEUTROPHILS # BLD AUTO: ABNORMAL CELLS/UL (ref 1500–7800)
NEUTROPHILS NFR BLD AUTO: 78.2 %
PLATELET # BLD AUTO: 799 THOUSAND/UL (ref 140–400)
PMV BLD REES-ECKER: 9.8 FL (ref 7.5–12.5)
RBC # BLD AUTO: 6.51 MILLION/UL (ref 3.8–5.1)
WBC # BLD AUTO: 15.9 THOUSAND/UL (ref 3.8–10.8)

## 2017-09-03 ENCOUNTER — TELEPHONE (OUTPATIENT)
Dept: HEMATOLOGY/ONCOLOGY | Facility: CLINIC | Age: 77
End: 2017-09-03

## 2017-09-11 ENCOUNTER — DOCUMENTATION ONLY (OUTPATIENT)
Dept: FAMILY MEDICINE | Facility: CLINIC | Age: 77
End: 2017-09-11

## 2017-09-11 NOTE — PROGRESS NOTES
Pre-Visit Chart Review  For Appointment Scheduled on 9/20/17.    Health Maintenance Due   Topic Date Due    TETANUS VACCINE  02/03/1958    DEXA SCAN  03/30/2017    Influenza Vaccine  08/01/2017    Urine Microalbumin  09/18/2017

## 2017-09-13 ENCOUNTER — OFFICE VISIT (OUTPATIENT)
Dept: PODIATRY | Facility: CLINIC | Age: 77
End: 2017-09-13
Payer: MEDICARE

## 2017-09-13 VITALS — WEIGHT: 187.63 LBS | BODY MASS INDEX: 34.53 KG/M2 | HEIGHT: 62 IN

## 2017-09-13 DIAGNOSIS — L60.0 INGROWN NAIL: ICD-10-CM

## 2017-09-13 DIAGNOSIS — M79.675 TOE PAIN, BILATERAL: ICD-10-CM

## 2017-09-13 DIAGNOSIS — B35.1 ONYCHOMYCOSIS DUE TO DERMATOPHYTE: Primary | ICD-10-CM

## 2017-09-13 DIAGNOSIS — M79.674 TOE PAIN, BILATERAL: ICD-10-CM

## 2017-09-13 PROCEDURE — 11750 EXCISION NAIL&NAIL MATRIX: CPT | Mod: 50,PBBFAC,PO | Performed by: PODIATRIST

## 2017-09-13 PROCEDURE — 99499 UNLISTED E&M SERVICE: CPT | Mod: S$PBB,,, | Performed by: PODIATRIST

## 2017-09-13 PROCEDURE — 11750 EXCISION NAIL&NAIL MATRIX: CPT | Mod: 51,TA,S$PBB, | Performed by: PODIATRIST

## 2017-09-13 PROCEDURE — 99213 OFFICE O/P EST LOW 20 MIN: CPT | Mod: PBBFAC,PO | Performed by: PODIATRIST

## 2017-09-13 PROCEDURE — 99999 PR PBB SHADOW E&M-EST. PATIENT-LVL III: CPT | Mod: PBBFAC,,, | Performed by: PODIATRIST

## 2017-09-13 RX ORDER — TOBRAMYCIN 3 MG/ML
SOLUTION/ DROPS OPHTHALMIC
Qty: 5 ML | Refills: 3 | Status: SHIPPED | OUTPATIENT
Start: 2017-09-13 | End: 2018-03-07

## 2017-09-13 NOTE — PROGRESS NOTES
Subjective:      Patient ID: Jonny Martinez is a 77 y.o. female.    Chief Complaint: nail procedure    Sharp pain both sides and proximal fold both hallux toenails.  Gradual onset, worsening over past several weeks, aggravated by increased weight bearing, shoe gear, pressure.  No previous medical treatment.  OTC pain med not helping.  Denies signs infection and recent trauma both big toes.    A1c and vascular dopplers reasonable expectation of healing.      Review of Systems   Constitution: Negative for chills, diaphoresis, fever, malaise/fatigue and night sweats.   Cardiovascular: Negative for claudication, cyanosis, leg swelling and syncope.   Skin: Positive for nail changes. Negative for color change, dry skin, rash, suspicious lesions and unusual hair distribution.   Musculoskeletal: Negative for falls, joint pain, joint swelling, muscle cramps, muscle weakness and stiffness.   Gastrointestinal: Negative for constipation, diarrhea, nausea and vomiting.   Neurological: Negative for brief paralysis, disturbances in coordination, focal weakness, numbness, paresthesias, sensory change and tremors.           Objective:      Physical Exam   Constitutional: She is oriented to person, place, and time. She appears well-developed and well-nourished. She is cooperative.   Oriented to time, place, and person.   Cardiovascular:   Pulses:       Dorsalis pedis pulses are 1+ on the right side, and 1+ on the left side.        Posterior tibial pulses are 1+ on the right side, and 1+ on the left side.   Capillary fill time 3-5 seconds.  All toes warm to touch.      Negative lower extremity edema bilateral.    Negative elevational pallor and dependent rubor bilateral.     Musculoskeletal:   Normal angle, base, station of gait. Decreased stride length, early heel off, moderately propulsive toe off bilateral.    All ten toes without clubbing, cyanosis, or signs of ischemia.      No pain to palpation bilateral lower extremities.       Range of motion, stability, muscle strength, and muscle tone are age and health appropriate normal bilateral feet and legs.       Lymphadenopathy:   Negative lymphadenopathy bilateral popliteal fossa and tarsal tunnel.     Neurological: She is alert and oriented to person, place, and time. She has normal strength. She is not disoriented. She displays no atrophy and no tremor. A sensory deficit is present. She exhibits normal muscle tone.   Reflex Scores:       Patellar reflexes are 2+ on the right side and 2+ on the left side.       Achilles reflexes are 2+ on the right side and 2+ on the left side.  Decreased/absent vibratory sensation bilateral feet to 128Hz tuning fork.     Skin: Skin is warm, dry and intact. No abrasion, no bruising, no burn, no ecchymosis, no laceration, no lesion, no petechiae and no rash noted. She is not diaphoretic. No cyanosis or erythema. No pallor. Nails show no clubbing.   Skin thin, atrophic, with decreased density and distribution of pedal hair bilateral, but without hyperpigmentation, dheeraj discoloration,  ulcers, masses, nodules or cords palpated bilateral feet and legs.      Toenails 1st, 2nd, 3rd, 4th, 5th  bilateral are hypertrophic thickened 2-3 mm, dystrophic, discolored tanish brown with tan, gray crumbly subungual debris.  Tender to distal nail plate pressure, without periungual skin abnormality of each.    Both borders and proximal nailfold both hallux mild erythema  without ulceration, drainage, pus, tracking, fluctuance, malodor, or cardinal signs infection.               Assessment:       Encounter Diagnoses   Name Primary?    Onychomycosis due to dermatophyte Yes    Toe pain, bilateral     Ingrown nail          Plan:       Jonny was seen today for nail procedure.    Diagnoses and all orders for this visit:    Onychomycosis due to dermatophyte    Toe pain, bilateral    Ingrown nail      I counseled the patient on her conditions, their implications and medical  management.      PREOPERATIVE DIAGNOSIS Ingrown toenail, both borders of the Bilateral 1st.    POSTOPERATIVE DIAGNOSIS:Same.     NAME OF THE PROCEDURE: Permanent matrixectomy, total nail matrix right and left hallux  SURGEON: Dr. Rosas Parkinson.   No surgical assist.     ESTIMATED BLOOD LOSS: Minimal, being less than 1 mL.     HEMOSTASIS: Anatomic dissection, direct pressure manually.     ANESTHESIA: 1% lidocaine plain.     PROCEDURE IN DETAIL: After that time-out procedure and all the patient   identifiers and site markings being in agreement, I anesthetized the surgical toe(s) with a total of 3 mL of 1% lidocaine plain per digit. After verifying anesthesia, I   used a [place] Elevator to completely remove both the right and left hallux toenail from all soft tissue attachments discarding in red bag medical waste.  Now, the matrix of the entire right and left hallux toenails was permanently destroyed with three consecutive 30-second   applications of 90% phenol, which was then neutralized with isopropyl alcohol   and rinsed with sterile normal saline, blotted dry and dressed with a thin layer   of antibiotic cream and a dry sterile dressing of 4 x 4s, Kirby and light   compression with Coban.     The patient was dispensed a surgical shoe today both fet  and a prescription for tobramycin drops 0.3% for twice daily application to the   wound and keep it covered at all times. Follow in this office in two weeks for   reevaluation, sooner p.r.n. if he experiences fever, chills, night sweats,   nausea, vomiting, redness, pain out of proportion, drainage, pus or malodor of the surgical toe.              Return in about 2 weeks (around 9/27/2017).

## 2017-09-15 DIAGNOSIS — Z78.0 ASYMPTOMATIC MENOPAUSAL STATE: Primary | ICD-10-CM

## 2017-09-20 ENCOUNTER — OFFICE VISIT (OUTPATIENT)
Dept: FAMILY MEDICINE | Facility: CLINIC | Age: 77
End: 2017-09-20
Payer: MEDICARE

## 2017-09-20 VITALS
HEIGHT: 62 IN | TEMPERATURE: 98 F | DIASTOLIC BLOOD PRESSURE: 68 MMHG | BODY MASS INDEX: 34.69 KG/M2 | SYSTOLIC BLOOD PRESSURE: 101 MMHG | HEART RATE: 90 BPM | WEIGHT: 188.5 LBS

## 2017-09-20 DIAGNOSIS — E11.42 TYPE 2 DIABETES MELLITUS WITH POLYNEUROPATHY: Primary | ICD-10-CM

## 2017-09-20 DIAGNOSIS — Z23 IMMUNIZATION DUE: ICD-10-CM

## 2017-09-20 DIAGNOSIS — I15.2 HYPERTENSION ASSOCIATED WITH DIABETES: ICD-10-CM

## 2017-09-20 DIAGNOSIS — R80.9 MICROALBUMINURIA DUE TO TYPE 2 DIABETES MELLITUS: ICD-10-CM

## 2017-09-20 DIAGNOSIS — R14.0 ABDOMINAL BLOATING: ICD-10-CM

## 2017-09-20 DIAGNOSIS — E11.69 HYPERLIPIDEMIA ASSOCIATED WITH TYPE 2 DIABETES MELLITUS: ICD-10-CM

## 2017-09-20 DIAGNOSIS — E11.59 HYPERTENSION ASSOCIATED WITH DIABETES: ICD-10-CM

## 2017-09-20 DIAGNOSIS — E78.5 HYPERLIPIDEMIA ASSOCIATED WITH TYPE 2 DIABETES MELLITUS: ICD-10-CM

## 2017-09-20 DIAGNOSIS — E11.29 MICROALBUMINURIA DUE TO TYPE 2 DIABETES MELLITUS: ICD-10-CM

## 2017-09-20 PROBLEM — Z99.81 OXYGEN DEPENDENT: Status: ACTIVE | Noted: 2017-09-20

## 2017-09-20 PROCEDURE — 3074F SYST BP LT 130 MM HG: CPT | Mod: ,,, | Performed by: FAMILY MEDICINE

## 2017-09-20 PROCEDURE — 99214 OFFICE O/P EST MOD 30 MIN: CPT | Mod: 25,S$PBB,, | Performed by: FAMILY MEDICINE

## 2017-09-20 PROCEDURE — 3078F DIAST BP <80 MM HG: CPT | Mod: ,,, | Performed by: FAMILY MEDICINE

## 2017-09-20 PROCEDURE — G0008 ADMIN INFLUENZA VIRUS VAC: HCPCS | Mod: PBBFAC,PO

## 2017-09-20 PROCEDURE — 1159F MED LIST DOCD IN RCRD: CPT | Mod: ,,, | Performed by: FAMILY MEDICINE

## 2017-09-20 PROCEDURE — 99999 PR PBB SHADOW E&M-EST. PATIENT-LVL V: CPT | Mod: PBBFAC,,, | Performed by: FAMILY MEDICINE

## 2017-09-20 PROCEDURE — 99215 OFFICE O/P EST HI 40 MIN: CPT | Mod: PBBFAC,PO | Performed by: FAMILY MEDICINE

## 2017-09-20 PROCEDURE — 1125F AMNT PAIN NOTED PAIN PRSNT: CPT | Mod: ,,, | Performed by: FAMILY MEDICINE

## 2017-09-20 RX ORDER — HYDROCODONE BITARTRATE AND ACETAMINOPHEN 10; 325 MG/1; MG/1
TABLET ORAL
Qty: 30 TABLET | Refills: 0 | Status: SHIPPED | OUTPATIENT
Start: 2017-09-20

## 2017-09-20 NOTE — PATIENT INSTRUCTIONS
Hold metformin for 2 days after the CT scan. Then have BMP in 2 days and if normal can restart metformin

## 2017-09-20 NOTE — PROGRESS NOTES
CHIEF COMPLAINT:  Follow up    HISTORY OF PRESENT ILLNESS:  Jonny Martinez is a 77 y.o. female who presents to clinic for follow up.  She is also established with Dr. Mcdermott, Dr. Arthur, Dr. Hart, and Dr. Larkin.    1. Type 2 DM:  She is on metformin, ASA. A recent HgA1c was 6.6%. She is not on a statin, but takes fish oil capsules. A urine microalbumin/cr rati was elevated.  She cannot be on an ACE-I, ARB due to hyperkalemia.  She states that her fasting blood sugars have been in the 110s-160s, the majority have been in the 130-140s.    2. HTN:  Established with Dr. Arthur.  She is on HCTZ, coreg, norvasc and prn catapres. She denies any CP, SOB, edema. She is following up with Dr. Arthur on Friday    3. She is due for an influenza vaccine    4. She has noticed severe abdominal bloating, she has followed up with Dr. Hart but sattes that she has not had any recent abdominal imaging.    REVIEW OF SYSTEMS:  The patient denies any fever, chills, night sweats, headaches, vision changes, difficulty speaking or swallowing, decreased hearing, weight loss, weight gain, chest pain, palpitations, shortness of breath, cough, nausea, vomiting, abdominal pain, dysuria, diarrhea, constipation, hematuria, hematochezia, melena, changes in her hair, skin, nails, numbness or weakness in her extremities, erythema, pain or swelling over any of her joints, myalgia, swollen glands, easy bruising, fatigue, edema, symptoms of anxiety or depression.      MEDICATIONS:   Reviewed and/or reconciled in EPIC    ALLERGIES:  Reviewed and/or reconciled in [facility]    PAST MEDICAL/SURGICAL HISTORY:   Past Medical History:   Diagnosis Date    Arthritis     Diabetes mellitus, type 2     Diverticula of colon     Fibromyalgia     GERD (gastroesophageal reflux disease)     High cholesterol     IBS (irritable bowel syndrome)       Past Surgical History:   Procedure Laterality Date    anal fissure repair      APPENDECTOMY       "CHOLECYSTECTOMY      HYSTERECTOMY      TONSILLECTOMY         FAMILY HISTORY:    Family History   Problem Relation Age of Onset    Aortic aneurysm Father     Cancer Mother      cervical    Diabetes Maternal Uncle     Allergic rhinitis Neg Hx     Angioedema Neg Hx     Asthma Neg Hx     Atopy Neg Hx     Eczema Neg Hx     Immunodeficiency Neg Hx     Urticaria Neg Hx     Breast cancer Neg Hx     Colon cancer Neg Hx     Ovarian cancer Neg Hx     Hypertension Neg Hx        SOCIAL HISTORY:    Social History     Social History    Marital status:      Spouse name: N/A    Number of children: N/A    Years of education: N/A     Occupational History    Not on file.     Social History Main Topics    Smoking status: Former Smoker     Packs/day: 0.20     Years: 5.00     Types: Cigarettes    Smokeless tobacco: Never Used      Comment: smoked 1 pack per week for about 5 years in 20s    Alcohol use No    Drug use: No    Sexual activity: Not Currently     Partners: Male     Other Topics Concern    Not on file     Social History Narrative    No narrative on file       PHYSICAL EXAM:  VITAL SIGNS:   Vitals:    09/20/17 1628   BP: 101/68   BP Location: Left arm   Patient Position: Sitting   BP Method: Small (Automatic)   Pulse: 90   Temp: 98.3 °F (36.8 °C)   TempSrc: Oral   Weight: 85.5 kg (188 lb 7.9 oz)   Height: 5' 1.5" (1.562 m)     GENERAL:  Patient appears well nourished, sitting on exam table, in no acute distress.  HEENT:  Atraumatic, normocephalic, PERRLA, EOMI, no conjunctival injection, sclerae are anicteric, normal external auditory canals,TMs clear b/l, gross hearing intact to whisper, MMM, no oropharygneal erythema or exudate.  NECK:  Supple, normal ROM, trachea is midline , no supraclavicular or cervical LAD or masses palpated.  Thyroid gland not palpable.  CARDIOVASCULAR:  RRR, normal S1 and S2, no m/r/g.  RESPIRATORY:  CTA b/l, no wheezes, rhonchi, rales.  No increased work of breathing, " no  use of accessory muscles.  ABDOMEN:  Soft, nontender, nondistended, normoactive bowel sounds in all four quadrants, no rebound or guarding, no HSM or masses palpated.  Normal percussion.  EXTREMITIES:  2+ DP pulses b/l, no edema.  SKIN:  Warm, no lesions on exposed skin.  NEUROMUSCULAR:  Cranial nerves II-XII grossly intact. No clubbing or cyanosis of digits/nails.  Steady gait.  PSYCH:  Patient is alert and oriented to person, time, place. They are appropriately dressed and groomed. There is normal eye contact. Rate and tone of speech is normal. Normal insight, judgement. Normal thought content and process.     LABORATORY/IMAGING STUDIES: pending    ASSESSMENT/PLAN: This is a 77 y.o. female who presents to clinic for evaluation of the following concerns  1. Type  2 DM: see below  2. HTN: see below  3. Obesity: see below  4. Hyperlipidemia: follow up with cardiology  5. Immunization due: will receive influenza vaccine in clinic today  6. Abdominal bloating: obtain CT of abdomen and pelvis    Patient readiness: acceptance and barriers:none    During the course of the visit the patient was educated and counseled about the following:     Diabetes:  At goal, continue with current medications.   Hypertension:   Medication: no change. follow up with Dr. Arthur next week.   Obesity:   General weight loss/lifestyle modification strategies discussed (elicit support from others; identify saboteurs; non-food rewards, etc).  Diet interventions: moderate (500 kCal/d) deficit diet.  Informal exercise measures discussed, e.g. taking stairs instead of elevator.    Goals: Diabetes: Maintain Hemoglobin A1C below 7, Hypertension: Reduce Blood Pressure and Obesity: Reduce calorie intake and BMI    Did patient meet goals/outcomes: No    The following self management tools provided: declined    Patient Instructions (the written plan) was given to the patient/family.     Time spent with patient: 55 minutes      Adriana Izaguirre MD

## 2017-09-27 ENCOUNTER — OFFICE VISIT (OUTPATIENT)
Dept: PODIATRY | Facility: CLINIC | Age: 77
End: 2017-09-27
Payer: MEDICARE

## 2017-09-27 VITALS — HEIGHT: 62 IN | WEIGHT: 191.13 LBS | BODY MASS INDEX: 35.17 KG/M2

## 2017-09-27 DIAGNOSIS — S91.109D WOUND, OPEN, TOE, SUBSEQUENT ENCOUNTER: Primary | ICD-10-CM

## 2017-09-27 PROCEDURE — 1159F MED LIST DOCD IN RCRD: CPT | Mod: ,,, | Performed by: PODIATRIST

## 2017-09-27 PROCEDURE — 99999 PR PBB SHADOW E&M-EST. PATIENT-LVL III: CPT | Mod: PBBFAC,,, | Performed by: PODIATRIST

## 2017-09-27 PROCEDURE — 99213 OFFICE O/P EST LOW 20 MIN: CPT | Mod: PBBFAC,PO | Performed by: PODIATRIST

## 2017-09-27 PROCEDURE — 1126F AMNT PAIN NOTED NONE PRSNT: CPT | Mod: ,,, | Performed by: PODIATRIST

## 2017-09-27 PROCEDURE — 99212 OFFICE O/P EST SF 10 MIN: CPT | Mod: S$PBB,,, | Performed by: PODIATRIST

## 2017-09-27 NOTE — PROGRESS NOTES
Subjective:      Patient ID: Jonny Martinez is a 77 y.o. female.    Chief Complaint: Post-op Evaluation (2 week (great nail) )    2 weeks s/p matrixectomy both hallux total.  Covering all times with band aid dressings and topical antibiotics    A1c and vascular dopplers reasonable expectation of healing.      Review of Systems   Constitution: Negative for chills, diaphoresis, fever, malaise/fatigue and night sweats.   Cardiovascular: Negative for claudication, cyanosis, leg swelling and syncope.   Skin: Positive for nail changes. Negative for color change, dry skin, rash, suspicious lesions and unusual hair distribution.   Musculoskeletal: Negative for falls, joint pain, joint swelling, muscle cramps, muscle weakness and stiffness.   Gastrointestinal: Negative for constipation, diarrhea, nausea and vomiting.   Neurological: Negative for brief paralysis, disturbances in coordination, focal weakness, numbness, paresthesias, sensory change and tremors.           Objective:      Physical Exam   Constitutional: She is oriented to person, place, and time. She appears well-developed and well-nourished. She is cooperative.   Oriented to time, place, and person.   Cardiovascular:   Pulses:       Dorsalis pedis pulses are 1+ on the right side, and 1+ on the left side.        Posterior tibial pulses are 1+ on the right side, and 1+ on the left side.   Capillary fill time 3-5 seconds.  All toes warm to touch.      Negative lower extremity edema bilateral.    Negative elevational pallor and dependent rubor bilateral.     Musculoskeletal:   Normal angle, base, station of gait. Decreased stride length, early heel off, moderately propulsive toe off bilateral.    All ten toes without clubbing, cyanosis, or signs of ischemia.      No pain to palpation bilateral lower extremities.      Range of motion, stability, muscle strength, and muscle tone are age and health appropriate normal bilateral feet and legs.       Lymphadenopathy:    Negative lymphadenopathy bilateral popliteal fossa and tarsal tunnel.     Neurological: She is alert and oriented to person, place, and time. She has normal strength. She is not disoriented. She displays no atrophy and no tremor. A sensory deficit is present. She exhibits normal muscle tone.   Reflex Scores:       Patellar reflexes are 2+ on the right side and 2+ on the left side.       Achilles reflexes are 2+ on the right side and 2+ on the left side.  Decreased/absent vibratory sensation bilateral feet to 128Hz tuning fork.     Skin: Skin is warm, dry and intact. No abrasion, no bruising, no burn, no ecchymosis, no laceration, no lesion, no petechiae and no rash noted. She is not diaphoretic. No cyanosis or erythema. No pallor. Nails show no clubbing.   Wounds both hallux toenails clean, dry, granular  without , drainage, pus, tracking, fluctuance, malodor, or cardinal signs infection.      Otherwise, Skin thin, atrophic, with decreased density and distribution of pedal hair bilateral, but without hyperpigmentation, dheeraj discoloration,  ulcers, masses, nodules or cords palpated bilateral feet and legs.                   Assessment:       Encounter Diagnosis   Name Primary?    Wound, open, toe, subsequent encounter Yes         Plan:       Jonny was seen today for post-op evaluation.    Diagnoses and all orders for this visit:    Wound, open, toe, subsequent encounter      I counseled the patient on her conditions, their implications and medical management.    Continue band aid dressings and antibiotics daily until completely healed.    Discussed conservative treatment with shoes of adequate dimensions, material, and style to alleviate symptoms and delay or prevent surgical intervention.         Return if symptoms worsen or fail to improve.

## 2017-10-10 ENCOUNTER — OFFICE VISIT (OUTPATIENT)
Dept: HEMATOLOGY/ONCOLOGY | Facility: CLINIC | Age: 77
End: 2017-10-10
Payer: MEDICARE

## 2017-10-10 VITALS
HEIGHT: 62 IN | HEART RATE: 83 BPM | RESPIRATION RATE: 18 BRPM | SYSTOLIC BLOOD PRESSURE: 123 MMHG | DIASTOLIC BLOOD PRESSURE: 80 MMHG | TEMPERATURE: 98 F | WEIGHT: 187.63 LBS | BODY MASS INDEX: 34.53 KG/M2

## 2017-10-10 DIAGNOSIS — D47.3 THROMBOCYTHEMIA, ESSENTIAL: Primary | ICD-10-CM

## 2017-10-10 PROCEDURE — 99214 OFFICE O/P EST MOD 30 MIN: CPT | Mod: ,,, | Performed by: INTERNAL MEDICINE

## 2017-10-10 NOTE — LETTER
October 11, 2017      Adriana Izaguirre MD  [address]  [place] [address]           [facility]  [address] [facility]  [address]  [place] [address]  Phone: [phone]  Fax: [phone]          Patient: Jonny Martinez   MR Number: [id number]   YOB: 1940   Date of Visit: 10/10/2017       Dear Dr. Adriana Izaguirre:    Thank you for referring Jonny Martinez to me for evaluation. Attached you will find relevant portions of my assessment and plan of care.    If you have questions, please do not hesitate to call me. I look forward to following Jonny Martinez along with you.    Sincerely,    BRANDY Mcdermott MD    Enclosure  CC:  No Recipients    If you would like to receive this communication electronically, please contact [email or web address] or [phone] to request more information on [employer] Link access.    For providers and/or their staff who would like to refer a patient to [facility], please contact us through our one-stop-shop provider referral line, [facility], at [phone].    If you feel you have received this communication in error or would no longer like to receive these types of communications, please e-mail [email or web address]

## 2017-10-11 NOTE — PROGRESS NOTES
[facility] History & Physical    Subjective:      Patient ID:   Jonny Martinez  77 y.o. female  1940        Chief Complaint:  Increased platelet count    HPI:  77 y.o. female with diagnosis of increased platelet count for several years. Clinically this is a myeloproliferative syndrome, and most consistent with essential thrombocythemia. Platelet count runs in the 700,000-900,000 range. She does take a baby aspirin daily. I have discussed with her Hydrea or Agrylin administration for the treatment of the increased platelet count today and on previous occasions. She does not agree to began Agrylin or Hydrea.    She has complaints of abdominal distention over the last 2 months without abdominal pain her abdomen is full, I do not palpate ascites, and it is nontender. I do not palpate abdominal masses. She is due to have a CAT scan of her abdomen on 10/17/2017.    Her history includes arthritis, diabetes, diverticulosis, fibromyalgia, GERD, high cholesterol, and irritable bowel syndrome.    Her history includes hysterectomy, cholecystectomy, appendectomy, tonsillectomy, anal fissure repair.    She is a former smoker of one pack per week, she does not drink alcohol with regularity, she is allergic to losartan, monosodium glutamate, niacin, and penicillin.      ROS:   GEN: normal without any fever, night sweats or weight loss  HEENT: normal with no HA's, sore throat, stiff neck, changes in vision  CV: normal with no CP, SOB, PND, SCRUGGS or orthopnea  PULM: normal with no SOB, cough, hemoptysis, sputum or pleuritic pain  GI: See history of present illness  : normal with no hematuria, dysuria  BREAST: normal with no mass, discharge, pain  SKIN: normal with no rash, erythema, bruising, or swelling     Past Medical History:   Diagnosis Date    Arthritis     Diabetes mellitus, type 2     Diverticula of colon     Fibromyalgia     GERD (gastroesophageal reflux disease)     High cholesterol     IBS (irritable bowel  "syndrome)      Past Surgical History:   Procedure Laterality Date    anal fissure repair      APPENDECTOMY      CHOLECYSTECTOMY      HYSTERECTOMY      TONSILLECTOMY         Review of patient's allergies indicates:   Allergen Reactions    Losartan      Made patient "feel weird"    Monosodium glutamate      Other reaction(s): flushing skin  Other reaction(s): elev b/p    Niacin      Other reaction(s): flushing skin  Other reaction(s): elev b/p    Penicillins      Other reaction(s): whelps     Social History     Social History    Marital status:      Spouse name: N/A    Number of children: N/A    Years of education: N/A     Occupational History    Not on file.     Social History Main Topics    Smoking status: Former Smoker     Packs/day: 0.20     Years: 5.00     Types: Cigarettes    Smokeless tobacco: Never Used      Comment: smoked 1 pack per week for about 5 years in 20s    Alcohol use No    Drug use: No    Sexual activity: Not Currently     Partners: Male     Other Topics Concern    Not on file     Social History Narrative    No narrative on file         Current Outpatient Prescriptions:     amlodipine (NORVASC) 5 MG tablet, Take 5 mg by mouth once daily., Disp: , Rfl:     ascorbic acid (VITAMIN C) 1000 MG tablet, Take 1,000 mg by mouth once daily., Disp: , Rfl:     aspirin (ECOTRIN) 81 MG EC tablet, Take 81 mg by mouth once daily., Disp: , Rfl:     carvedilol (COREG) 12.5 MG tablet, Take 12.5 mg by mouth 2 (two) times daily with meals., Disp: , Rfl:     cholecalciferol, vitamin D3, 5,000 unit Tab, Take 5,000 Units by mouth once daily., Disp: , Rfl:     cloNIDine (CATAPRES) 0.1 MG tablet, Take 1 tablet every 12 hours as needed , Disp: , Rfl: 0    coenzyme Q10 10 mg capsule, Take 10 mg by mouth once daily., Disp: , Rfl:     dicyclomine (BENTYL) 20 mg tablet, Four times a day, Disp: , Rfl:     fluticasone (FLONASE) 50 mcg/actuation nasal spray, SHAKE LIQUID AND USE 2 SPRAYS IN EACH " "NOSTRIL EVERY DAY, Disp: 48 g, Rfl: 3    FREESTYLE LANCETS 28 gauge lancets, , Disp: , Rfl: 5    GRAPE SEED EXTRACT (GRAPE SEED ORAL), Take 325 mg by mouth once daily., Disp: , Rfl:     GREEN TEA EXTRACT ORAL, Take 1 tablet by mouth once daily., Disp: , Rfl:     hydrochlorothiazide (HYDRODIURIL) 25 MG tablet, Take 25 mg by mouth once daily., Disp: , Rfl: 3    hydrocodone-acetaminophen 10-325mg (NORCO)  mg Tab, 1/2 to 1 tablet PO daily prn pain., Disp: 30 tablet, Rfl: 0    LACTOBACILLUS ACIDOPHILUS (PROBIOTIC ORAL), Take 1 capsule by mouth once daily., Disp: , Rfl:     metformin (GLUCOPHAGE) 1000 MG tablet, Take 1,000 mg by mouth 2 (two) times daily with meals. , Disp: , Rfl:     olive leaf extract 250 mg Cap, Take 1 capsule by mouth once daily., Disp: , Rfl:     OMEGA-3 FATTY ACIDS/FISH OIL (OMEGA 3 FISH OIL ORAL), No Sig Provided, Disp: , Rfl:     ONE TOUCH ULTRA TEST Strp, , Disp: , Rfl: 5    pantoprazole (PROTONIX) 40 MG tablet, Take 40 mg by mouth once daily. , Disp: , Rfl: 9    resveratrol 250 mg Cap, Take 1 capsule by mouth once daily., Disp: , Rfl:     tobramycin sulfate 0.3% (TOBREX) 0.3 % ophthalmic solution, 1-2 drops topically twice daily to affected area right and left big toe., Disp: 5 mL, Rfl: 3          Objective:   Vitals:  Blood pressure 123/80, pulse 83, temperature 97.9 °F (36.6 °C), resp. rate 18, height 5' 1.5" (1.562 m), weight 85.1 kg (187 lb 9.6 oz).    Physical Examination:   GEN: no apparent distress, comfortable  HEAD: atraumatic and normocephalic  EYES: no pallor, no icterus  ENT:  no pharyngeal erythema, external ears WNL; no nasal discharge; no thrush  NECK: no masses, thyroid normal, trachea midline, no LAD/LN's, supple  CV: RRR with no murmur; normal pulse; normal S1 and S2  CHEST: Normal respiratory effort; CTAB; normal breath sounds; no wheeze or crackles  ABDOM: Abdomen is full and firm. I do not palpate ascites. The abdomen is nontender. I do not palpate " enlargement of the liver, spleen, or abdominal mass.  MUSC/Skeletal: ROM normal; no crepitus; joints normal  EXTREM: no clubbing, cyanosis, inflammation or swelling  SKIN: no rashes, lesions, ulcers, petechiae   : no hickman  NEURO: grossly intact; motor/sensory WNL; no tremors  PSYCH: normal mood, affect and behavior  LYMPH: normal cervical, supraclavicular, axillary and groin LN's      Labs:   Lab Results   Component Value Date    WBC 15.9 (H) 08/31/2017    HGB 15.3 08/31/2017    HCT 50.1 (H) 08/31/2017    MCV 77.0 (L) 08/31/2017     (H) 08/31/2017    CMP  Sodium   Date Value Ref Range Status   08/15/2016 141 136 - 145 mmol/L Final     Potassium   Date Value Ref Range Status   08/15/2016 4.1 3.5 - 5.1 mmol/L Final     Chloride   Date Value Ref Range Status   08/15/2016 100 95 - 110 mmol/L Final     CO2   Date Value Ref Range Status   08/15/2016 25 23 - 29 mmol/L Final     Glucose   Date Value Ref Range Status   08/15/2016 183 (H) 70 - 110 mg/dL Final     BUN, Bld   Date Value Ref Range Status   08/15/2016 23 8 - 23 mg/dL Final     Creatinine   Date Value Ref Range Status   08/15/2016 0.9 0.5 - 1.4 mg/dL Final     Calcium   Date Value Ref Range Status   08/15/2016 9.8 8.7 - 10.5 mg/dL Final     Total Protein   Date Value Ref Range Status   07/30/2010 6.4 6.0 - 8.4 gm/dl Final     Albumin   Date Value Ref Range Status   07/30/2010 3.4 (L) 3.5 - 5.2 g/dl Final     Total Bilirubin   Date Value Ref Range Status   07/30/2010 0.3 0.1 - 1.0 mg/dl Final     Comment:     For infants and newborns, interpretation of results should be based  on gestational age, weight and in agreement with clinical  observations.  .  Premature Infant recommended reference ranges:  Up to 24 hours.............<8.0 mg/dl  Up to 48 hours............<12.0 mg/dl  3-5 days..................<15.0 mg/dl  6-29 days.................<15.0 mg/dl       Alkaline Phosphatase   Date Value Ref Range Status   07/30/2010 56 55 - 135 U/L Final     AST    Date Value Ref Range Status   07/30/2010 18 10 - 40 U/L Final     ALT   Date Value Ref Range Status   07/30/2010 13 10 - 44 U/L Final     Anion Gap   Date Value Ref Range Status   08/15/2016 16 8 - 16 mmol/L Final     eGFR if    Date Value Ref Range Status   08/15/2016 >60.0 >60 mL/min/1.73 m^2 Final     eGFR if non    Date Value Ref Range Status   08/15/2016 >60.0 >60 mL/min/1.73 m^2 Final     Comment:     Calculation used to obtain the estimated glomerular filtration  rate (eGFR) is the CKD-EPI equation. Since race is unknown   in our information system, the eGFR values for   -American and Non--American patients are given   for each creatinine result.       I have reviewed all available lab results and radiology reports.    Radiology/Diagnostic Studies:  Hemoglobin is 14.6, white blood count is 15,000, white count is 728,000.    MRI of brain in October 2016 showed microvascular disease  Assessment:   (1) 77 y.o. female with diagnosis of Myeloproliferative syndrome, essential thrombocythemia being managed with aspirin 81 mg by mouth daily. She will have CBC done monthly and follow up here in 3 months time.    She does not agree to take Hydrea or Agrylin for control of the increased platelet count. I expect that aspirin will be effective here in reducing her risk of CVA event.      1. Thrombocythemia, essential        Plan:       Thrombocythemia, essential  -     CBC auto differential; Future; Expected date: 11/10/2017      Return in about 3 months (around 1/10/2018) for check of blood status after therapy.    I have explained and the patient understands all of  the current recommendation(s). I have answered all of her questions to the best of my ability and to her complete satisfaction.

## 2017-10-17 ENCOUNTER — HOSPITAL ENCOUNTER (OUTPATIENT)
Dept: RADIOLOGY | Facility: HOSPITAL | Age: 77
Discharge: HOME | End: 2017-10-17
Attending: FAMILY MEDICINE
Payer: MEDICARE

## 2017-10-17 DIAGNOSIS — R14.0 ABDOMINAL BLOATING: ICD-10-CM

## 2017-10-17 PROCEDURE — 25500020 PHARM REV CODE 255

## 2017-10-17 PROCEDURE — 74177 CT ABD & PELVIS W/CONTRAST: CPT | Mod: TC

## 2017-10-17 PROCEDURE — 74177 CT ABD & PELVIS W/CONTRAST: CPT | Mod: 26,,, | Performed by: RADIOLOGY

## 2017-10-17 RX ADMIN — IOHEXOL 30 ML: 350 INJECTION, SOLUTION INTRAVENOUS at 01:10

## 2017-10-17 RX ADMIN — IOHEXOL 100 ML: 350 INJECTION, SOLUTION INTRAVENOUS at 01:10

## 2017-10-18 ENCOUNTER — TELEPHONE (OUTPATIENT)
Dept: FAMILY MEDICINE | Facility: CLINIC | Age: 77
End: 2017-10-18

## 2017-10-18 NOTE — TELEPHONE ENCOUNTER
Abdominal CT demonstrated fatty liver disease, enlarged spleen, 4.5 cm periumblical hernia containing fat, a 2.2 cm left kidney cyst, atelectasis of right lung and elevated right diaphragm, degenerative changes in her back.    Needs to eat low fat diet, work on weight loss so that fatty liver disease does not progress and follow up with GI for any abdominal pain.

## 2017-11-11 LAB
BASOPHILS # BLD AUTO: 197 CELLS/UL (ref 0–200)
BASOPHILS NFR BLD AUTO: 1.4 %
EOSINOPHIL # BLD AUTO: 437 CELLS/UL (ref 15–500)
EOSINOPHIL NFR BLD AUTO: 3.1 %
ERYTHROCYTE [DISTWIDTH] IN BLOOD BY AUTOMATED COUNT: 19.7 % (ref 11–15)
HCT VFR BLD AUTO: 48.6 % (ref 35–45)
HGB BLD-MCNC: 14.9 G/DL (ref 11.7–15.5)
LYMPHOCYTES # BLD AUTO: 1763 CELLS/UL (ref 850–3900)
LYMPHOCYTES NFR BLD AUTO: 12.5 %
MCH RBC QN AUTO: 23.9 PG (ref 27–33)
MCHC RBC AUTO-ENTMCNC: 30.7 G/DL (ref 32–36)
MCV RBC AUTO: 78 FL (ref 80–100)
MONOCYTES # BLD AUTO: 635 CELLS/UL (ref 200–950)
MONOCYTES NFR BLD AUTO: 4.5 %
NEUTROPHILS # BLD AUTO: ABNORMAL CELLS/UL (ref 1500–7800)
NEUTROPHILS NFR BLD AUTO: 78.5 %
PLATELET # BLD AUTO: 860 THOUSAND/UL (ref 140–400)
PMV BLD REES-ECKER: 9.9 FL (ref 7.5–12.5)
RBC # BLD AUTO: 6.23 MILLION/UL (ref 3.8–5.1)
WBC # BLD AUTO: 14.1 THOUSAND/UL (ref 3.8–10.8)

## 2017-12-23 LAB
BASOPHILS # BLD AUTO: 160 CELLS/UL (ref 0–200)
BASOPHILS NFR BLD AUTO: 1.1 %
EOSINOPHIL # BLD AUTO: 421 CELLS/UL (ref 15–500)
EOSINOPHIL NFR BLD AUTO: 2.9 %
ERYTHROCYTE [DISTWIDTH] IN BLOOD BY AUTOMATED COUNT: 19.3 % (ref 11–15)
HCT VFR BLD AUTO: 48.6 % (ref 35–45)
HGB BLD-MCNC: 14.7 G/DL (ref 11.7–15.5)
LYMPHOCYTES # BLD AUTO: 1827 CELLS/UL (ref 850–3900)
LYMPHOCYTES NFR BLD AUTO: 12.6 %
MCH RBC QN AUTO: 23.8 PG (ref 27–33)
MCHC RBC AUTO-ENTMCNC: 30.2 G/DL (ref 32–36)
MCV RBC AUTO: 78.6 FL (ref 80–100)
MONOCYTES # BLD AUTO: 580 CELLS/UL (ref 200–950)
MONOCYTES NFR BLD AUTO: 4 %
NEUTROPHILS # BLD AUTO: ABNORMAL CELLS/UL (ref 1500–7800)
NEUTROPHILS NFR BLD AUTO: 79.4 %
PLATELET # BLD AUTO: 855 THOUSAND/UL (ref 140–400)
PMV BLD REES-ECKER: 10 FL (ref 7.5–12.5)
RBC # BLD AUTO: 6.18 MILLION/UL (ref 3.8–5.1)
WBC # BLD AUTO: 14.5 THOUSAND/UL (ref 3.8–10.8)

## 2018-01-09 ENCOUNTER — OFFICE VISIT (OUTPATIENT)
Dept: HEMATOLOGY/ONCOLOGY | Facility: CLINIC | Age: 78
End: 2018-01-09
Payer: MEDICARE

## 2018-01-09 VITALS
HEIGHT: 61 IN | DIASTOLIC BLOOD PRESSURE: 86 MMHG | WEIGHT: 188.13 LBS | HEART RATE: 79 BPM | RESPIRATION RATE: 18 BRPM | BODY MASS INDEX: 35.52 KG/M2 | TEMPERATURE: 98 F | SYSTOLIC BLOOD PRESSURE: 128 MMHG

## 2018-01-09 DIAGNOSIS — D75.839 THROMBOCYTOSIS: Primary | ICD-10-CM

## 2018-01-09 PROCEDURE — 99214 OFFICE O/P EST MOD 30 MIN: CPT | Mod: ,,, | Performed by: INTERNAL MEDICINE

## 2018-01-09 RX ORDER — KETOCONAZOLE 20 MG/G
CREAM TOPICAL
Refills: 1 | COMMUNITY
Start: 2017-10-09 | End: 2018-03-07

## 2018-01-09 NOTE — LETTER
January 9, 2018      Adriana Izaguirre MD  [address]  [place] [address]           [facility]  [address] [facility] [address]  [address]  [place] [address]  Phone: [phone]  Fax: [phone]          Patient: Jonny Martinez   MR Number: [id number]   YOB: 1940   Date of Visit: 1/9/2018       Dear Dr. Adriana Izaguirre:    Thank you for referring Jonny Martinez to me for evaluation. Attached you will find relevant portions of my assessment and plan of care.    If you have questions, please do not hesitate to call me. I look forward to following Jonny Martinez along with you.    Sincerely,    BRANDY Mcdermott MD    Enclosure  CC:  No Recipients    If you would like to receive this communication electronically, please contact [email or web address] or [phone] to request more information on [employer] Link access.    For providers and/or their staff who would like to refer a patient to [facility], please contact us through our one-stop-shop provider referral line, [facility], at [phone].    If you feel you have received this communication in error or would no longer like to receive these types of communications, please e-mail [email or web address]

## 2018-01-10 NOTE — PROGRESS NOTES
[facility] History & Physical    Subjective:      Patient ID:   Jonny Martinez  77 y.o. female  1940        Chief Complaint:  Increased platelet count    HPI:  77 y.o. female with diagnosis of increased platelet count for several years. Clinically this is a myeloproliferative syndrome, and most consistent with essential thrombocythemia. Platelet count runs in the 700,000-900,000 range. She does take a baby aspirin daily. I have discussed with her Hydrea or Agrylin administration for the treatment of the increased platelet count today and on previous occasions. She does not agree to began Agrylin or Hydrea.    She has complaints of abdominal distention over the last 2 months without abdominal pain her abdomen is full, I do not palpate ascites, and it is nontender. I do not palpate abdominal masses. She  had a CAT scan of her abdomen negative results, 10/2017.    Her history includes arthritis, diabetes, diverticulosis, fibromyalgia, GERD, high cholesterol, and irritable bowel syndrome.    Her history includes hysterectomy, cholecystectomy, appendectomy, tonsillectomy, anal fissure repair.    She is a former smoker of one pack per week, she does not drink alcohol with regularity, she is allergic to losartan, monosodium glutamate, niacin, and penicillin.      ROS:   GEN: normal without any fever, night sweats or weight loss  HEENT: normal with no HA's, sore throat, stiff neck, changes in vision  CV: normal with no CP, SOB, PND, SCRUGGS or orthopnea  PULM: normal with no SOB, cough, hemoptysis, sputum or pleuritic pain  GI: See history of present illness  : normal with no hematuria, dysuria  BREAST: normal with no mass, discharge, pain  SKIN: normal with no rash, erythema, bruising, or swelling     Past Medical History:   Diagnosis Date    Arthritis     Diabetes mellitus, type 2     Diverticula of colon     Fibromyalgia     GERD (gastroesophageal reflux disease)     High cholesterol     IBS (irritable bowel  "syndrome)      Past Surgical History:   Procedure Laterality Date    anal fissure repair      APPENDECTOMY      CHOLECYSTECTOMY      HYSTERECTOMY      TONSILLECTOMY         Review of patient's allergies indicates:   Allergen Reactions    Losartan      Made patient "feel weird"    Monosodium glutamate      Other reaction(s): flushing skin  Other reaction(s): elev b/p    Niacin      Other reaction(s): flushing skin  Other reaction(s): elev b/p    Penicillins      Other reaction(s): whelps     Social History     Social History    Marital status:      Spouse name: N/A    Number of children: N/A    Years of education: N/A     Occupational History    Not on file.     Social History Main Topics    Smoking status: Former Smoker     Packs/day: 0.20     Years: 5.00     Types: Cigarettes    Smokeless tobacco: Never Used      Comment: smoked 1 pack per week for about 5 years in 20s    Alcohol use No    Drug use: No    Sexual activity: Not Currently     Partners: Male     Other Topics Concern    Not on file     Social History Narrative    No narrative on file         Current Outpatient Prescriptions:     amlodipine (NORVASC) 5 MG tablet, Take 5 mg by mouth once daily., Disp: , Rfl:     ascorbic acid (VITAMIN C) 1000 MG tablet, Take 1,000 mg by mouth once daily., Disp: , Rfl:     aspirin (ECOTRIN) 81 MG EC tablet, Take 81 mg by mouth once daily., Disp: , Rfl:     carvedilol (COREG) 12.5 MG tablet, Take 12.5 mg by mouth 2 (two) times daily with meals., Disp: , Rfl:     cholecalciferol, vitamin D3, 5,000 unit Tab, Take 5,000 Units by mouth once daily., Disp: , Rfl:     cloNIDine (CATAPRES) 0.1 MG tablet, Take 1 tablet every 12 hours as needed , Disp: , Rfl: 0    coenzyme Q10 10 mg capsule, Take 10 mg by mouth once daily., Disp: , Rfl:     dicyclomine (BENTYL) 20 mg tablet, Four times a day, Disp: , Rfl:     FREESTYLE LANCETS 28 gauge lancets, , Disp: , Rfl: 5    GRAPE SEED EXTRACT (GRAPE SEED " "ORAL), Take 325 mg by mouth once daily., Disp: , Rfl:     GREEN TEA EXTRACT ORAL, Take 1 tablet by mouth once daily., Disp: , Rfl:     hydrochlorothiazide (HYDRODIURIL) 25 MG tablet, Take 25 mg by mouth once daily., Disp: , Rfl: 3    hydrocodone-acetaminophen 10-325mg (NORCO)  mg Tab, 1/2 to 1 tablet PO daily prn pain., Disp: 30 tablet, Rfl: 0    ketoconazole (NIZORAL) 2 % cream, , Disp: , Rfl: 1    LACTOBACILLUS ACIDOPHILUS (PROBIOTIC ORAL), Take 1 capsule by mouth once daily., Disp: , Rfl:     metformin (GLUCOPHAGE) 1000 MG tablet, Take 1,000 mg by mouth 2 (two) times daily with meals. , Disp: , Rfl:     olive leaf extract 250 mg Cap, Take 1 capsule by mouth once daily., Disp: , Rfl:     OMEGA-3 FATTY ACIDS/FISH OIL (OMEGA 3 FISH OIL ORAL), No Sig Provided, Disp: , Rfl:     ONE TOUCH ULTRA TEST Strp, , Disp: , Rfl: 5    pantoprazole (PROTONIX) 40 MG tablet, Take 40 mg by mouth once daily. , Disp: , Rfl: 9    resveratrol 250 mg Cap, Take 1 capsule by mouth once daily., Disp: , Rfl:     tobramycin sulfate 0.3% (TOBREX) 0.3 % ophthalmic solution, 1-2 drops topically twice daily to affected area right and left big toe., Disp: 5 mL, Rfl: 3          Objective:   Vitals:  Blood pressure 128/86, pulse 79, temperature 98 °F (36.7 °C), resp. rate 18, height 5' 1" (1.549 m), weight 85.3 kg (188 lb 1.6 oz).    Physical Examination:   GEN: no apparent distress, comfortable  HEAD: atraumatic and normocephalic  EYES: no pallor, no icterus  ENT:  no pharyngeal erythema, external ears WNL; no nasal discharge; no thrush  NECK: no masses, thyroid normal, trachea midline, no LAD/LN's, supple  CV: RRR with no murmur; normal pulse; normal S1 and S2  CHEST: Normal respiratory effort; CTAB; normal breath sounds; no wheeze or crackles  ABDOM: Abdomen is full and firm. I do not palpate ascites. The abdomen is nontender. I do not palpate enlargement of the liver, spleen, or abdominal mass.  MUSC/Skeletal: ROM normal; no " crepitus; joints normal  EXTREM: no clubbing, cyanosis, inflammation or swelling  SKIN: no rashes, lesions, ulcers, petechiae   : no hickman  NEURO: grossly intact; motor/sensory WNL; no tremors  PSYCH: normal mood, affect and behavior  LYMPH: normal cervical, supraclavicular, axillary and groin LN's      Labs:   Lab Results   Component Value Date    WBC 15.9 (H) 08/31/2017    HGB 15.3 08/31/2017    HCT 50.1 (H) 08/31/2017    MCV 77.0 (L) 08/31/2017     (H) 08/31/2017    CMP  Sodium   Date Value Ref Range Status   10/17/2017 143 136 - 145 mmol/L Final     Potassium   Date Value Ref Range Status   10/17/2017 4.5 3.5 - 5.1 mmol/L Final     Chloride   Date Value Ref Range Status   10/17/2017 106 95 - 110 mmol/L Final     CO2   Date Value Ref Range Status   10/17/2017 27 23 - 29 mmol/L Final     Glucose   Date Value Ref Range Status   10/17/2017 122 (H) 70 - 110 mg/dL Final     BUN, Bld   Date Value Ref Range Status   10/17/2017 13 8 - 23 mg/dL Final     Creatinine   Date Value Ref Range Status   10/17/2017 0.8 0.5 - 1.4 mg/dL Final     Calcium   Date Value Ref Range Status   10/17/2017 9.5 8.7 - 10.5 mg/dL Final     Total Protein   Date Value Ref Range Status   07/30/2010 6.4 6.0 - 8.4 gm/dl Final     Albumin   Date Value Ref Range Status   07/30/2010 3.4 (L) 3.5 - 5.2 g/dl Final     Total Bilirubin   Date Value Ref Range Status   07/30/2010 0.3 0.1 - 1.0 mg/dl Final     Comment:     For infants and newborns, interpretation of results should be based  on gestational age, weight and in agreement with clinical  observations.  .  Premature Infant recommended reference ranges:  Up to 24 hours.............<8.0 mg/dl  Up to 48 hours............<12.0 mg/dl  3-5 days..................<15.0 mg/dl  6-29 days.................<15.0 mg/dl       Alkaline Phosphatase   Date Value Ref Range Status   07/30/2010 56 55 - 135 U/L Final     AST   Date Value Ref Range Status   07/30/2010 18 10 - 40 U/L Final     ALT   Date Value Ref  Range Status   07/30/2010 13 10 - 44 U/L Final     Anion Gap   Date Value Ref Range Status   10/17/2017 10 8 - 16 mmol/L Final     eGFR if    Date Value Ref Range Status   10/17/2017 >60 >60 mL/min/1.73 m^2 Final     eGFR if non    Date Value Ref Range Status   10/17/2017 >60 >60 mL/min/1.73 m^2 Final     Comment:     Calculation used to obtain the estimated glomerular filtration  rate (eGFR) is the CKD-EPI equation. Since race is unknown   in our information system, the eGFR values for   -American and Non--American patients are given   for each creatinine result.       I have reviewed all available lab results and radiology reports.    Radiology/Diagnostic Studies:  Hemoglobin is 14.6, white blood count is 15,000, white count is 728,000.    MRI of brain in October 2016 showed microvascular disease  Assessment:   (1) 77 y.o. female with diagnosis of Myeloproliferative syndrome, essential thrombocythemia being managed with aspirin 81 mg by mouth daily. She will have CBC done monthly and follow up here in 3 months time.    She does not agree to take Hydrea or Agrylin for control of the increased platelet count. I expect that aspirin will be effective here in reducing her risk of CVA event.    She is starting a herbal/ vitamen suppliment for control of platelet count.    RTC 4 months with CBC.

## 2018-01-27 LAB
BASOPHILS # BLD AUTO: 216 CELLS/UL (ref 0–200)
BASOPHILS NFR BLD AUTO: 1.4 %
EOSINOPHIL # BLD AUTO: 493 CELLS/UL (ref 15–500)
EOSINOPHIL NFR BLD AUTO: 3.2 %
ERYTHROCYTE [DISTWIDTH] IN BLOOD BY AUTOMATED COUNT: 19 % (ref 11–15)
HCT VFR BLD AUTO: 47.6 % (ref 35–45)
HGB BLD-MCNC: 14.9 G/DL (ref 11.7–15.5)
LYMPHOCYTES # BLD AUTO: 2202 CELLS/UL (ref 850–3900)
LYMPHOCYTES NFR BLD AUTO: 14.3 %
MCH RBC QN AUTO: 24 PG (ref 27–33)
MCHC RBC AUTO-ENTMCNC: 31.3 G/DL (ref 32–36)
MCV RBC AUTO: 76.5 FL (ref 80–100)
MONOCYTES # BLD AUTO: 647 CELLS/UL (ref 200–950)
MONOCYTES NFR BLD AUTO: 4.2 %
NEUTROPHILS # BLD AUTO: ABNORMAL CELLS/UL (ref 1500–7800)
NEUTROPHILS NFR BLD AUTO: 76.9 %
PLATELET # BLD AUTO: 927 THOUSAND/UL (ref 140–400)
PMV BLD REES-ECKER: 10.4 FL (ref 7.5–12.5)
RBC # BLD AUTO: 6.22 MILLION/UL (ref 3.8–5.1)
WBC # BLD AUTO: 15.4 THOUSAND/UL (ref 3.8–10.8)

## 2018-02-20 ENCOUNTER — TELEPHONE (OUTPATIENT)
Dept: HEMATOLOGY/ONCOLOGY | Facility: CLINIC | Age: 78
End: 2018-02-20

## 2018-02-20 NOTE — TELEPHONE ENCOUNTER
Notified pt that last Platelet count was 728 most recent was elevated at 927, confirmed pt was taking her   Aspirin she said she was. Instructed to recheck CBC in 6 weeks and stay on aspirin. ERIN

## 2018-02-22 ENCOUNTER — TELEPHONE (OUTPATIENT)
Dept: FAMILY MEDICINE | Facility: CLINIC | Age: 78
End: 2018-02-22

## 2018-02-22 DIAGNOSIS — Z12.31 SCREENING MAMMOGRAM, ENCOUNTER FOR: Primary | ICD-10-CM

## 2018-02-22 NOTE — TELEPHONE ENCOUNTER
----- Message from Pro Turner sent at 2/22/2018  3:30 PM CST -----  Contact: Pt  Pt is calling states she's gotten 2nd notice about getting a mammo. Pt can be reached at [phone] (htal)

## 2018-02-24 LAB
BASOPHILS # BLD AUTO: 233 CELLS/UL (ref 0–200)
BASOPHILS NFR BLD AUTO: 1.5 %
EOSINOPHIL # BLD AUTO: 465 CELLS/UL (ref 15–500)
EOSINOPHIL NFR BLD AUTO: 3 %
ERYTHROCYTE [DISTWIDTH] IN BLOOD BY AUTOMATED COUNT: 19.4 % (ref 11–15)
HCT VFR BLD AUTO: 47 % (ref 35–45)
HGB BLD-MCNC: 14.5 G/DL (ref 11.7–15.5)
LYMPHOCYTES # BLD AUTO: 1659 CELLS/UL (ref 850–3900)
LYMPHOCYTES NFR BLD AUTO: 10.7 %
MCH RBC QN AUTO: 24.1 PG (ref 27–33)
MCHC RBC AUTO-ENTMCNC: 30.9 G/DL (ref 32–36)
MCV RBC AUTO: 78.1 FL (ref 80–100)
MONOCYTES # BLD AUTO: 667 CELLS/UL (ref 200–950)
MONOCYTES NFR BLD AUTO: 4.3 %
NEUTROPHILS # BLD AUTO: ABNORMAL CELLS/UL (ref 1500–7800)
NEUTROPHILS NFR BLD AUTO: 80.5 %
PLATELET # BLD AUTO: 936 THOUSAND/UL (ref 140–400)
PMV BLD REES-ECKER: 10.6 FL (ref 7.5–12.5)
RBC # BLD AUTO: 6.02 MILLION/UL (ref 3.8–5.1)
WBC # BLD AUTO: 15.5 THOUSAND/UL (ref 3.8–10.8)

## 2018-03-02 DIAGNOSIS — E11.8 TYPE 2 DIABETES MELLITUS WITH COMPLICATION, WITHOUT LONG-TERM CURRENT USE OF INSULIN: Primary | ICD-10-CM

## 2018-03-06 ENCOUNTER — DOCUMENTATION ONLY (OUTPATIENT)
Dept: FAMILY MEDICINE | Facility: CLINIC | Age: 78
End: 2018-03-06

## 2018-03-06 NOTE — PROGRESS NOTES
Pre-Visit Chart Review  For Appointment Scheduled on 3/7/18.    Health Maintenance Due   Topic Date Due    TETANUS VACCINE  02/03/1960    Urine Microalbumin  09/18/2017    Eye Exam  03/15/2018    DEXA SCAN  03/30/2018

## 2018-03-07 ENCOUNTER — OFFICE VISIT (OUTPATIENT)
Dept: FAMILY MEDICINE | Facility: CLINIC | Age: 78
End: 2018-03-07
Payer: MEDICARE

## 2018-03-07 VITALS
WEIGHT: 189.13 LBS | HEIGHT: 61 IN | HEART RATE: 81 BPM | DIASTOLIC BLOOD PRESSURE: 77 MMHG | BODY MASS INDEX: 35.71 KG/M2 | TEMPERATURE: 98 F | SYSTOLIC BLOOD PRESSURE: 121 MMHG

## 2018-03-07 DIAGNOSIS — E11.59 HYPERTENSION ASSOCIATED WITH DIABETES: ICD-10-CM

## 2018-03-07 DIAGNOSIS — E66.01 SEVERE OBESITY (BMI 35.0-39.9): ICD-10-CM

## 2018-03-07 DIAGNOSIS — E78.5 HYPERLIPIDEMIA ASSOCIATED WITH TYPE 2 DIABETES MELLITUS: ICD-10-CM

## 2018-03-07 DIAGNOSIS — M94.9 DISORDER OF BONE AND CARTILAGE: ICD-10-CM

## 2018-03-07 DIAGNOSIS — E11.42 TYPE 2 DIABETES MELLITUS WITH POLYNEUROPATHY: Primary | ICD-10-CM

## 2018-03-07 DIAGNOSIS — Z12.39 SCREENING FOR BREAST CANCER: ICD-10-CM

## 2018-03-07 DIAGNOSIS — E11.69 HYPERLIPIDEMIA ASSOCIATED WITH TYPE 2 DIABETES MELLITUS: ICD-10-CM

## 2018-03-07 DIAGNOSIS — Z13.820 SCREENING FOR OSTEOPOROSIS: ICD-10-CM

## 2018-03-07 DIAGNOSIS — M89.9 DISORDER OF BONE AND CARTILAGE: ICD-10-CM

## 2018-03-07 DIAGNOSIS — I15.2 HYPERTENSION ASSOCIATED WITH DIABETES: ICD-10-CM

## 2018-03-07 LAB
CREAT UR-MCNC: 76 MG/DL
MICROALBUMIN UR DL<=1MG/L-MCNC: 48 UG/ML
MICROALBUMIN/CREATININE RATIO: 63.2 UG/MG

## 2018-03-07 PROCEDURE — 99214 OFFICE O/P EST MOD 30 MIN: CPT | Mod: PBBFAC,PO | Performed by: FAMILY MEDICINE

## 2018-03-07 PROCEDURE — 82043 UR ALBUMIN QUANTITATIVE: CPT

## 2018-03-07 PROCEDURE — 99214 OFFICE O/P EST MOD 30 MIN: CPT | Mod: S$PBB,,, | Performed by: FAMILY MEDICINE

## 2018-03-07 PROCEDURE — 99999 PR PBB SHADOW E&M-EST. PATIENT-LVL IV: CPT | Mod: PBBFAC,,, | Performed by: FAMILY MEDICINE

## 2018-03-07 NOTE — PROGRESS NOTES
CHIEF COMPLAINT:  Follow up    HISTORY OF PRESENT ILLNESS:  Jonny Martinez is a 78 y.o. female who presents to clinic for follow up.  She is also established with Dr. Mcdermott, Dr. Artuhr, Dr. Hart, and Dr. Larkin.    1. Type 2 DM:  She is on metformin, ASA. A recent HgA1c was 6.8%. She is not on a statin, but takes fish oil capsules. A urine microalbumin/cr ratio was elevated.  She cannot be on an ACE-I, ARB due to hyperkalemia.  She states that her fasting blood sugars have been in the 110s-160s, the majority have been in the 130-140s.    2. HTN:  Established with Dr. Arthur.  She is on HCTZ, coreg, norvasc and prn catapres. She denies any CP, SOB, edema.     3. She is due for a screening mammogram.    4. She is due for a dexa sxan.       REVIEW OF SYSTEMS:  The patient denies any fever, chills, night sweats, headaches, vision changes, difficulty speaking or swallowing, decreased hearing, weight loss, weight gain, chest pain, palpitations, shortness of breath, cough, nausea, vomiting, abdominal pain, dysuria, diarrhea, constipation, hematuria, hematochezia, melena, changes in her hair, skin, nails, numbness or weakness in her extremities, erythema, pain or swelling over any of her joints, myalgia, swollen glands, easy bruising, fatigue, edema, symptoms of anxiety or depression.      MEDICATIONS:   Reviewed and/or reconciled in EPIC    ALLERGIES:  Reviewed and/or reconciled in [facility]    PAST MEDICAL/SURGICAL HISTORY:   Past Medical History:   Diagnosis Date    Arthritis     Diabetes mellitus, type 2     Diverticula of colon     Fibromyalgia     GERD (gastroesophageal reflux disease)     High cholesterol     IBS (irritable bowel syndrome)       Past Surgical History:   Procedure Laterality Date    anal fissure repair      APPENDECTOMY      CHOLECYSTECTOMY      HYSTERECTOMY      TONSILLECTOMY         FAMILY HISTORY:    Family History   Problem Relation Age of Onset    Aortic aneurysm Father      Cancer Mother      cervical    Diabetes Maternal Uncle     Allergic rhinitis Neg Hx     Angioedema Neg Hx     Asthma Neg Hx     Atopy Neg Hx     Eczema Neg Hx     Immunodeficiency Neg Hx     Urticaria Neg Hx     Breast cancer Neg Hx     Colon cancer Neg Hx     Ovarian cancer Neg Hx     Hypertension Neg Hx        SOCIAL HISTORY:    Social History     Social History    Marital status:      Spouse name: N/A    Number of children: N/A    Years of education: N/A     Occupational History    Not on file.     Social History Main Topics    Smoking status: Former Smoker     Packs/day: 0.20     Years: 5.00     Types: Cigarettes    Smokeless tobacco: Never Used      Comment: smoked 1 pack per week for about 5 years in 20s    Alcohol use No    Drug use: No    Sexual activity: Not Currently     Partners: Male     Other Topics Concern    Not on file     Social History Narrative    No narrative on file       PHYSICAL EXAM:  VITAL SIGNS:   There were no vitals filed for this visit.  GENERAL:  Patient appears well nourished, sitting on exam table, in no acute distress.  HEENT:  Atraumatic, normocephalic, PERRLA, EOMI, no conjunctival injection, sclerae are anicteric, normal external auditory canals,TMs clear b/l, gross hearing intact to whisper, MMM, no oropharygneal erythema or exudate.  NECK:  Supple, normal ROM, trachea is midline , no supraclavicular or cervical LAD or masses palpated.  Thyroid gland not palpable.  CARDIOVASCULAR:  RRR, normal S1 and S2, no m/r/g.  RESPIRATORY:  CTA b/l, no wheezes, rhonchi, rales.  No increased work of breathing, no  use of accessory muscles.  ABDOMEN:  Soft, nontender, nondistended, normoactive bowel sounds in all four quadrants, no rebound or guarding, no HSM or masses palpated.  Normal percussion.  EXTREMITIES:  2+ DP pulses b/l, no edema.  SKIN:  Warm, no lesions on exposed skin.  NEUROMUSCULAR:  Cranial nerves II-XII grossly intact. No clubbing or cyanosis  of digits/nails.  Steady gait.  PSYCH:  Patient is alert and oriented to person, time, place. They are appropriately dressed and groomed. There is normal eye contact. Rate and tone of speech is normal. Normal insight, judgement. Normal thought content and process.     LABORATORY/IMAGING STUDIES: pending    ASSESSMENT/PLAN: This is a 78 y.o. female who presents to clinic for evaluation of the following concerns  1. Type 2 diabetes mellitus with polyneuropathy  See below    2. Hyperlipidemia associated with type 2 diabetes mellitus  - Comprehensive metabolic panel; Future  - Lipid panel; Future    3. Hypertension associated with diabetes  See below    4. Severe obesity (BMI 35.0-39.9)  See gaywo    5. Screening for breast cancer  - Mammo Digital Screening Bilat with CAD; Future    6. Screening for osteoporosis  - DXA Bone Density Spine And Hip; Future      Patient readiness: acceptance and barriers:none    During the course of the visit the patient was educated and counseled about the following:     Diabetes: HgA1c, urine microalbumin/cr  Hypertension:   Medication: no change.    Obesity:   General weight loss/lifestyle modification strategies discussed (elicit support from others; identify saboteurs; non-food rewards, etc).  Diet interventions: moderate (500 kCal/d) deficit diet.  Informal exercise measures discussed, e.g. taking stairs instead of elevator.    Goals: Diabetes: Maintain Hemoglobin A1C below 7, Hypertension: Reduce Blood Pressure and Obesity: Reduce calorie intake and BMI    Did patient meet goals/outcomes: No    The following self management tools provided: declined    Patient Instructions (the written plan) was given to the patient/family.     Time spent with patient: 55 minutes      Adriana Izaguirre MD

## 2018-04-07 LAB
BASOPHILS # BLD AUTO: 213 CELLS/UL (ref 0–200)
BASOPHILS NFR BLD AUTO: 1.4 %
EOSINOPHIL # BLD AUTO: 395 CELLS/UL (ref 15–500)
EOSINOPHIL NFR BLD AUTO: 2.6 %
ERYTHROCYTE [DISTWIDTH] IN BLOOD BY AUTOMATED COUNT: 19.8 % (ref 11–15)
HCT VFR BLD AUTO: 49.9 % (ref 35–45)
HGB BLD-MCNC: 15.7 G/DL (ref 11.7–15.5)
LYMPHOCYTES # BLD AUTO: 1915 CELLS/UL (ref 850–3900)
LYMPHOCYTES NFR BLD AUTO: 12.6 %
MCH RBC QN AUTO: 24 PG (ref 27–33)
MCHC RBC AUTO-ENTMCNC: 31.5 G/DL (ref 32–36)
MCV RBC AUTO: 76.4 FL (ref 80–100)
MONOCYTES # BLD AUTO: 714 CELLS/UL (ref 200–950)
MONOCYTES NFR BLD AUTO: 4.7 %
NEUTROPHILS # BLD AUTO: ABNORMAL CELLS/UL (ref 1500–7800)
NEUTROPHILS NFR BLD AUTO: 78.7 %
PLATELET # BLD AUTO: 843 THOUSAND/UL (ref 140–400)
PMV BLD REES-ECKER: 10.4 FL (ref 7.5–12.5)
RBC # BLD AUTO: 6.53 MILLION/UL (ref 3.8–5.1)
WBC # BLD AUTO: 15.2 THOUSAND/UL (ref 3.8–10.8)

## 2018-04-21 ENCOUNTER — TELEPHONE (OUTPATIENT)
Dept: FAMILY MEDICINE | Facility: CLINIC | Age: 78
End: 2018-04-21

## 2018-04-23 ENCOUNTER — OFFICE VISIT (OUTPATIENT)
Dept: UROLOGY | Facility: CLINIC | Age: 78
End: 2018-04-23
Payer: MEDICARE

## 2018-04-23 VITALS
HEART RATE: 83 BPM | RESPIRATION RATE: 18 BRPM | TEMPERATURE: 98 F | DIASTOLIC BLOOD PRESSURE: 83 MMHG | BODY MASS INDEX: 34.12 KG/M2 | SYSTOLIC BLOOD PRESSURE: 139 MMHG | WEIGHT: 185.44 LBS | HEIGHT: 62 IN

## 2018-04-23 DIAGNOSIS — N39.0 FREQUENT UTI: Primary | ICD-10-CM

## 2018-04-23 LAB
BILIRUB SERPL-MCNC: NORMAL MG/DL
BLOOD URINE, POC: NORMAL
COLOR, POC UA: YELLOW
GLUCOSE UR QL STRIP: NORMAL
KETONES UR QL STRIP: NORMAL
LEUKOCYTE ESTERASE URINE, POC: NORMAL
NITRITE, POC UA: NORMAL
PH, POC UA: 5
PROTEIN, POC: NORMAL
SPECIFIC GRAVITY, POC UA: 1.02
UROBILINOGEN, POC UA: NORMAL

## 2018-04-23 PROCEDURE — 81002 URINALYSIS NONAUTO W/O SCOPE: CPT | Mod: PBBFAC,PN | Performed by: UROLOGY

## 2018-04-23 PROCEDURE — 99213 OFFICE O/P EST LOW 20 MIN: CPT | Mod: PBBFAC,PN | Performed by: UROLOGY

## 2018-04-23 PROCEDURE — 99213 OFFICE O/P EST LOW 20 MIN: CPT | Mod: S$PBB,,, | Performed by: UROLOGY

## 2018-04-23 PROCEDURE — 99999 PR PBB SHADOW E&M-EST. PATIENT-LVL III: CPT | Mod: PBBFAC,,, | Performed by: UROLOGY

## 2018-04-23 NOTE — PROGRESS NOTES
OFFICE NOTE    CHIEF COMPLAINT:  History of recurrent urinary tract infections.    HISTORY OF PRESENT ILLNESS:  Ms. Martinez is a 78-year-old female who has   history of recurrent urinary tract infections.  The patient is diabetic.  Since   the last visit in 02/2016, the patient denies any infections, is doing well, has   no dysuria, no hematuria.  She feels that the flow is adequate and has nocturia   x2 to 3 times.  The patient has no significant pain, and she feels that she can   empty the bladder satisfactorily.    In the past, the patient has been taking cranberry extract, but she has   discontinued that medication.  The patient also referred to me that she is going   to move to live close to her daughter in the state of [place] and I encouraged   her to have a urologist to follow her over there.  The remaining of the medical   and surgical history are well documented in the medical record, including   allergies and current medications and all this was reviewed by me during this   visit.  There are no significant changes in the last year.    The urinalysis today is completely clear.    I explained to the patient that she needs to follow simple hygiene rules to   avoid urinary tract infections like avoiding prolonged bathtubs, also avoid   wiping from the back to the front, always from the front to the back.  She   understood all my recommendations.  I explained to the patient that at the   present time she has no evidence of urinary tract infections and if she is   keeping the hygiene as she has been doing during this year she will do fine.    All the questions were answered at her satisfaction.  She left the office in   satisfactory condition.  I spent approximately 25 minutes with Ms. Martinez and   all the time was spent on counseling.      EOR/HN  dd: 04/23/2018 16:55:15 (CDT)  td: 04/24/2018 04:45:40 (CDT)  Doc ID   #[id number]  Job ID #[id number]    CC:

## 2018-05-03 ENCOUNTER — HOSPITAL ENCOUNTER (OUTPATIENT)
Dept: RADIOLOGY | Facility: CLINIC | Age: 78
Discharge: HOME | End: 2018-05-03
Attending: PODIATRIST
Payer: MEDICARE

## 2018-05-03 ENCOUNTER — OFFICE VISIT (OUTPATIENT)
Dept: PODIATRY | Facility: CLINIC | Age: 78
End: 2018-05-03
Payer: MEDICARE

## 2018-05-03 VITALS — WEIGHT: 184.31 LBS | HEIGHT: 61 IN | BODY MASS INDEX: 34.8 KG/M2

## 2018-05-03 DIAGNOSIS — M79.671 FOOT PAIN, BILATERAL: Primary | ICD-10-CM

## 2018-05-03 DIAGNOSIS — M24.573 EQUINUS CONTRACTURE OF ANKLE: ICD-10-CM

## 2018-05-03 DIAGNOSIS — M79.671 FOOT PAIN, BILATERAL: ICD-10-CM

## 2018-05-03 DIAGNOSIS — M79.672 FOOT PAIN, BILATERAL: Primary | ICD-10-CM

## 2018-05-03 DIAGNOSIS — M77.9 CAPSULITIS: ICD-10-CM

## 2018-05-03 DIAGNOSIS — M05.711 RHEUMATOID ARTHRITIS INVOLVING RIGHT SHOULDER WITH POSITIVE RHEUMATOID FACTOR: ICD-10-CM

## 2018-05-03 DIAGNOSIS — M79.672 FOOT PAIN, BILATERAL: ICD-10-CM

## 2018-05-03 LAB
BASOPHILS # BLD AUTO: 207 CELLS/UL (ref 0–200)
BASOPHILS NFR BLD AUTO: 1.3 %
EOSINOPHIL # BLD AUTO: 461 CELLS/UL (ref 15–500)
EOSINOPHIL NFR BLD AUTO: 2.9 %
ERYTHROCYTE [DISTWIDTH] IN BLOOD BY AUTOMATED COUNT: 20 % (ref 11–15)
HCT VFR BLD AUTO: 48.2 % (ref 35–45)
HGB BLD-MCNC: 14.6 G/DL (ref 11.7–15.5)
LYMPHOCYTES # BLD AUTO: 2035 CELLS/UL (ref 850–3900)
LYMPHOCYTES NFR BLD AUTO: 12.8 %
MCH RBC QN AUTO: 23.3 PG (ref 27–33)
MCHC RBC AUTO-ENTMCNC: 30.3 G/DL (ref 32–36)
MCV RBC AUTO: 76.9 FL (ref 80–100)
MONOCYTES # BLD AUTO: 716 CELLS/UL (ref 200–950)
MONOCYTES NFR BLD AUTO: 4.5 %
NEUTROPHILS # BLD AUTO: ABNORMAL CELLS/UL (ref 1500–7800)
NEUTROPHILS NFR BLD AUTO: 78.5 %
PLATELET # BLD AUTO: 859 THOUSAND/UL (ref 140–400)
PMV BLD REES-ECKER: 10.2 FL (ref 7.5–12.5)
RBC # BLD AUTO: 6.27 MILLION/UL (ref 3.8–5.1)
WBC # BLD AUTO: 15.9 THOUSAND/UL (ref 3.8–10.8)

## 2018-05-03 PROCEDURE — 29540 STRAPPING ANKLE &/FOOT: CPT | Mod: 50,S$PBB,, | Performed by: PODIATRIST

## 2018-05-03 PROCEDURE — 73630 X-RAY EXAM OF FOOT: CPT | Mod: 50,TC,FY,PO

## 2018-05-03 PROCEDURE — 99213 OFFICE O/P EST LOW 20 MIN: CPT | Mod: PBBFAC,PO,25 | Performed by: PODIATRIST

## 2018-05-03 PROCEDURE — 73630 X-RAY EXAM OF FOOT: CPT | Mod: 26,50,S$GLB, | Performed by: RADIOLOGY

## 2018-05-03 PROCEDURE — 99213 OFFICE O/P EST LOW 20 MIN: CPT | Mod: 25,S$PBB,, | Performed by: PODIATRIST

## 2018-05-03 PROCEDURE — 99999 PR PBB SHADOW E&M-EST. PATIENT-LVL III: CPT | Mod: PBBFAC,,, | Performed by: PODIATRIST

## 2018-05-03 PROCEDURE — 29540 STRAPPING ANKLE &/FOOT: CPT | Mod: 50,PBBFAC,PO | Performed by: PODIATRIST

## 2018-05-03 NOTE — PROGRESS NOTES
Subjective:      Patient ID: Jonny Martinez is a 78 y.o. female.    Chief Complaint: Foot Pain (sharp pain bilateral sometimes burning)    Deep aching throbbing , sharp pain forefoot right and left.  Gradual onset, worsening over past several weeks, aggravated by increased weight bearing, shoe gear, pressure.  No previous medical treatment.  OTC pain med not helping.  Denies trauma, surgery both feet.    Review of Systems   Constitution: Negative for chills, diaphoresis, fever, malaise/fatigue and night sweats.   Cardiovascular: Negative for claudication, cyanosis, leg swelling and syncope.   Skin: Negative for color change, dry skin, rash, suspicious lesions and unusual hair distribution.   Musculoskeletal: Positive for joint pain and joint swelling. Negative for falls, muscle cramps, muscle weakness and stiffness.   Gastrointestinal: Negative for constipation, diarrhea, nausea and vomiting.   Neurological: Negative for brief paralysis, disturbances in coordination, focal weakness, numbness, paresthesias, sensory change and tremors.           Objective:      Physical Exam   Constitutional: She is oriented to person, place, and time. She appears well-developed and well-nourished. She is cooperative.   Oriented to time, place, and person.   Cardiovascular:   Pulses:       Dorsalis pedis pulses are 1+ on the right side, and 1+ on the left side.        Posterior tibial pulses are 1+ on the right side, and 1+ on the left side.   Capillary fill time 3-5 seconds.  All toes warm to touch.      Negative lower extremity edema bilateral.    Negative elevational pallor and dependent rubor bilateral.     Musculoskeletal:   Normal angle, base, station of gait. Decreased stride length, early heel off, moderately propulsive toe off bilateral.    All ten toes without clubbing, cyanosis, or signs of ischemia.      Pain to palpation inferior mtpj 1-5 bilateral without evidence of trauma or infection.      Ankle dorsiflexion  decreased at <10 degrees bilateral with moderate increase with knee flexion bilateral.      Otherwise, Range of motion, stability, muscle strength, and muscle tone are age and health appropriate normal bilateral feet and legs.       Lymphadenopathy:   Negative lymphadenopathy bilateral popliteal fossa and tarsal tunnel.  Negative lymphangitic streaking bilateral foot/ankle bilateral.     Neurological: She is alert and oriented to person, place, and time. She has normal strength. She is not disoriented. She displays no atrophy and no tremor. No sensory deficit. She exhibits normal muscle tone.   Reflex Scores:       Patellar reflexes are 2+ on the right side and 2+ on the left side.       Achilles reflexes are 2+ on the right side and 2+ on the left side.  Negative tinel sign to percussion sural, superficial peroneal, deep peroneal, saphenous, and posterior tibial nerves right and left ankles and feet.    Negative allodynia both feet.   Skin: Skin is warm, dry and intact. No abrasion, no bruising, no burn, no ecchymosis, no laceration, no lesion, no petechiae and no rash noted. She is not diaphoretic. No cyanosis or erythema. No pallor. Nails show no clubbing.   Skin thin, atrophic, with decreased density and distribution of pedal hair bilateral, but without hyperpigmentation, dheeraj discoloration,  ulcers, masses, nodules or cords palpated bilateral feet and legs.                   Assessment:       Encounter Diagnoses   Name Primary?   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        Patient will stretch the tendo achilles complex three times daily as demonstrated in the office.  Literature was dispensed illustrating proper stretching technique.    I applied a plantar rest strapping to the patient's right and left foot to offload symptomatic area, support the arch, and relieve pain.    Patient will obtain over the counter arch supports and wear them in shoes whenever possible.  Athletic shoes intended for walking or running are usually best.    Discussed conservative treatment with shoes of adequate dimensions, material, and style to alleviate symptoms and delay or prevent surgical intervention.    Rx xrays, lidocaine gel.          No Follow-up on file.

## 2018-05-08 LAB
ALT SERPL-CCNC: NORMAL U/L
AST SERPL-CCNC: NORMAL U/L
BUN BLD-MCNC: 14 MG/DL (ref 4–21)
CHOLEST SERPL-MSCNC: 173 MG/DL (ref 0–200)
CREAT SERPL-MCNC: 0.7 MG/DL
GLUCOSE 1H P 100 G GLC PO SERPL-MCNC: NORMAL MG/DL (ref ?–200)
GLUCOSE 2H P 100 G GLC PO SERPL-MCNC: NORMAL MG/DL (ref ?–140)
HBA1C MFR BLD: 6.8 %
HDLC SERPL-MCNC: 35 MG/DL
LDLC SERPL CALC-MCNC: 98 MG/DL (ref 0–160)
MICROALBUMIN URINE: NORMAL
MICROALBUMIN/CREATININE RATIO: NORMAL
PROTEIN/CREATININE RATIO: NORMAL
TRIGL SERPL-MCNC: 280 MG/DL

## 2018-05-10 ENCOUNTER — TELEPHONE (OUTPATIENT)
Dept: HEMATOLOGY/ONCOLOGY | Facility: CLINIC | Age: 78
End: 2018-05-10

## 2018-05-10 NOTE — TELEPHONE ENCOUNTER
Called the pt to let her know the  Reviewed her labs and she can keep her f/u appt.    Noticed that the pt didn't have an appt so Mini scheduled her a f/u before she moves to [place] 5/29/18.    Asked that the pt have another CBC @ [facility] before her F\U.    Keira FRANCIS

## 2018-05-21 ENCOUNTER — HOSPITAL ENCOUNTER (OUTPATIENT)
Dept: RADIOLOGY | Facility: CLINIC | Age: 78
Discharge: HOME | End: 2018-05-21
Attending: PODIATRIST
Payer: MEDICARE

## 2018-05-21 ENCOUNTER — OFFICE VISIT (OUTPATIENT)
Dept: PODIATRY | Facility: CLINIC | Age: 78
End: 2018-05-21
Payer: MEDICARE

## 2018-05-21 VITALS — BODY MASS INDEX: 34.8 KG/M2 | WEIGHT: 184.31 LBS | HEIGHT: 61 IN

## 2018-05-21 DIAGNOSIS — M79.671 FOOT PAIN, RIGHT: ICD-10-CM

## 2018-05-21 DIAGNOSIS — M72.2 PLANTAR FASCIITIS: Primary | ICD-10-CM

## 2018-05-21 DIAGNOSIS — S90.32XA CONTUSION OF LEFT FOOT, INITIAL ENCOUNTER: Primary | ICD-10-CM

## 2018-05-21 DIAGNOSIS — M79.672 FOOT PAIN, LEFT: ICD-10-CM

## 2018-05-21 PROCEDURE — 99999 PR PBB SHADOW E&M-EST. PATIENT-LVL III: CPT | Mod: PBBFAC,,, | Performed by: PODIATRIST

## 2018-05-21 PROCEDURE — 73630 X-RAY EXAM OF FOOT: CPT | Mod: TC,FY,PO,LT

## 2018-05-21 PROCEDURE — 99213 OFFICE O/P EST LOW 20 MIN: CPT | Mod: PBBFAC,25,PO | Performed by: PODIATRIST

## 2018-05-21 PROCEDURE — 73630 X-RAY EXAM OF FOOT: CPT | Mod: 26,LT,S$GLB, | Performed by: RADIOLOGY

## 2018-05-21 PROCEDURE — 99213 OFFICE O/P EST LOW 20 MIN: CPT | Mod: S$PBB,,, | Performed by: PODIATRIST

## 2018-05-21 NOTE — PROGRESS NOTES
Subjective:      Patient ID: Jonny Martinez is a 78 y.o. female.    Chief Complaint: Foot Injury (right foot )    Sharp deep pain left foot on top.  Sudden onset stepping off a ladder 3 days ago.  Minimal improvement since.  aggravated by increased weight bearing, shoe gear, pressure.  No previous medical treatment.  OTC pain med not helping. xrays today show no acute injury.    Review of Systems   Constitution: Negative for chills, diaphoresis, fever, malaise/fatigue and night sweats.   Cardiovascular: Negative for claudication, cyanosis, leg swelling and syncope.   Skin: Negative for color change, dry skin, nail changes, rash, suspicious lesions and unusual hair distribution.   Musculoskeletal: Positive for joint pain and joint swelling. Negative for falls, muscle cramps, muscle weakness and stiffness.   Gastrointestinal: Negative for constipation, diarrhea, nausea and vomiting.   Neurological: Negative for brief paralysis, disturbances in coordination, focal weakness, numbness, paresthesias, sensory change and tremors.           Objective:      Physical Exam   Constitutional: She is oriented to person, place, and time. She appears well-developed and well-nourished. She is cooperative. No distress.   Cardiovascular:   Pulses:       Popliteal pulses are 2+ on the right side, and 2+ on the left side.        Dorsalis pedis pulses are 1+ on the right side, and 1+ on the left side.        Posterior tibial pulses are 1+ on the right side, and 1+ on the left side.   Capillary refill 3 seconds all toes/distal feet, all toes/both feet warm to touch.      Negative lymphadenopathy bilateral popliteal fossa and tarsal tunnel.      Negavie lower extremity edema bilateral.     Musculoskeletal:        Right ankle: She exhibits normal range of motion, no swelling, no ecchymosis, no deformity, no laceration and normal pulse. Achilles tendon normal. Achilles tendon exhibits no pain, no defect and normal White's test  results.   Bruising top left foot with pain to palpation and range of motion mtj left without deformity or loss of function      Otherwise, Normal angle, base, station of gait. All ten toes without clubbing, cyanosis, or signs of ischemia.  No pain to palpation bilateral lower extremities.  Range of motion, stability, muscle strength, and muscle tone normal bilateral feet and legs.     Lymphadenopathy:   Negative lymphadenopathy bilateral popliteal fossa and tarsal tunnel.    Negative lymphangitic streaking bilateral feet/ankles/legs.   Neurological: She is alert and oriented to person, place, and time. She has normal strength. She displays no atrophy and no tremor. No sensory deficit. She exhibits normal muscle tone. Gait normal.   Reflex Scores:       Patellar reflexes are 2+ on the right side and 2+ on the left side.       Achilles reflexes are 2+ on the right side and 2+ on the left side.  Negative tinel sign to percussion sural, superficial peroneal, deep peroneal, saphenous, and posterior tibial nerves right and left ankles and feet.     Skin: Skin is warm, dry and intact. Capillary refill takes 2 to 3 seconds. Bruising (dorsum left foot.) noted. No abrasion, no burn, no ecchymosis, no laceration, no lesion and no rash noted. She is not diaphoretic. No cyanosis or erythema. No pallor. Nails show no clubbing.     Otherwise, Skin is normal age and health appropriate color, turgor, texture, and temperature bilateral lower extremities without ulceration, hyperpigmentation, discoloration, masses nodules or cords palpated.  No ecchymosis, erythema, edema, or cardinal signs of infection bilateral lower extremities.     Psychiatric: She has a normal mood and affect.             Assessment:       Encounter Diagnosis   Name Primary?    Contusion of left foot, initial encounter Yes         Plan:       Jonny was seen today for foot injury.    Diagnoses and all orders for this visit:    Contusion of left foot, initial  encounter  -     WALKER FOR HOME USE    Other orders  -     Cancel: X-Ray Foot Complete Right; Future      I counseled the patient on her conditions, their implications and medical management.        Compression toes to calf in neutral position left foot/ankle.    Dispense fx boot left - ambulate to tolerance with walker for stability weaning to shoes when symptoms allow.    RIICE.    nsaid otc prn per label.    The patient was advised that NSAID-type medications have two very important potential side effects: gastrointestinal irritation including hemorrhage and renal injuries. She was asked to take the medication with food and to stop if she experiences any GI upset. I asked her to call for vomiting, abdominal pain or black/bloody stools. The patient expresses understanding of these issues and questions were answered.            Follow-up in about 2 weeks (around 6/4/2018).

## 2018-05-22 ENCOUNTER — OFFICE VISIT (OUTPATIENT)
Dept: HEMATOLOGY/ONCOLOGY | Facility: CLINIC | Age: 78
End: 2018-05-22
Payer: MEDICARE

## 2018-05-22 VITALS
RESPIRATION RATE: 18 BRPM | HEART RATE: 89 BPM | TEMPERATURE: 99 F | SYSTOLIC BLOOD PRESSURE: 124 MMHG | DIASTOLIC BLOOD PRESSURE: 69 MMHG

## 2018-05-22 DIAGNOSIS — D75.839 THROMBOCYTOSIS: Primary | ICD-10-CM

## 2018-05-22 PROCEDURE — 99213 OFFICE O/P EST LOW 20 MIN: CPT | Mod: ,,, | Performed by: INTERNAL MEDICINE

## 2018-05-22 NOTE — PROGRESS NOTES
[facility] History & Physical    Subjective:      Patient ID:   Jonny Martinez  78 y.o. female  1940        Chief Complaint:  Increased platelet count    HPI:  78 y.o. female with diagnosis of increased platelet count for several years. Clinically this is a myeloproliferative syndrome, and most consistent with essential thrombocythemia. Platelet count runs in the 700,000-900,000 range. She does take a baby aspirin daily. I have discussed with her Hydrea or Agrylin administration for the treatment of the increased platelet count today and on previous occasions. She does not agree to began Agrylin or Hydrea.    She has complaints of abdominal distention over the last 2 months without abdominal pain her abdomen is full, I do not palpate ascites, and it is nontender. I do not palpate abdominal masses. She  had a CAT scan of her abdomen negative results, 10/2017.    Her history includes arthritis, diabetes, diverticulosis, fibromyalgia, GERD, high cholesterol, and irritable bowel syndrome.    Her history includes hysterectomy, cholecystectomy, appendectomy, tonsillectomy, anal fissure repair.    She is a former smoker of one pack per week, she does not drink alcohol with regularity, she is allergic to losartan, monosodium glutamate, niacin, and penicillin.      ROS:   GEN: normal without any fever, night sweats or weight loss  HEENT: normal with no HA's, sore throat, stiff neck, changes in vision  CV: normal with no CP, SOB, PND, SCRUGGS or orthopnea  PULM: normal with no SOB, cough, hemoptysis, sputum or pleuritic pain  GI: See history of present illness  : normal with no hematuria, dysuria  BREAST: normal with no mass, discharge, pain  SKIN: normal with no rash, erythema, bruising, or swelling     Past Medical History:   Diagnosis Date    Arthritis     Diabetes mellitus, type 2     Diverticula of colon     Fibromyalgia     GERD (gastroesophageal reflux disease)     High cholesterol     IBS (irritable bowel  "syndrome)      Past Surgical History:   Procedure Laterality Date    anal fissure repair      APPENDECTOMY      CHOLECYSTECTOMY      HYSTERECTOMY      TONSILLECTOMY         Review of patient's allergies indicates:   Allergen Reactions    Losartan      Made patient "feel weird"    Monosodium glutamate      Other reaction(s): flushing skin  Other reaction(s): elev b/p    Niacin      Other reaction(s): flushing skin  Other reaction(s): elev b/p    Penicillins      Other reaction(s): whelps     Social History     Social History    Marital status:      Spouse name: N/A    Number of children: N/A    Years of education: N/A     Occupational History    Not on file.     Social History Main Topics    Smoking status: Former Smoker     Packs/day: 0.20     Years: 5.00     Types: Cigarettes    Smokeless tobacco: Never Used      Comment: smoked 1 pack per week for about 5 years in 20s    Alcohol use No    Drug use: No    Sexual activity: Not Currently     Partners: Male     Other Topics Concern    Not on file     Social History Narrative    No narrative on file         Current Outpatient Prescriptions:     amlodipine (NORVASC) 5 MG tablet, Take 5 mg by mouth once daily., Disp: , Rfl:     ascorbic acid (VITAMIN C) 1000 MG tablet, Take 1,000 mg by mouth once daily., Disp: , Rfl:     aspirin (ECOTRIN) 81 MG EC tablet, Take 81 mg by mouth once daily., Disp: , Rfl:     carvedilol (COREG) 12.5 MG tablet, Take 12.5 mg by mouth 2 (two) times daily with meals., Disp: , Rfl:     cholecalciferol, vitamin D3, 5,000 unit Tab, Take 5,000 Units by mouth once daily., Disp: , Rfl:     cloNIDine (CATAPRES) 0.1 MG tablet, Take 1 tablet every 12 hours as needed , Disp: , Rfl: 0    coenzyme Q10 10 mg capsule, Take 10 mg by mouth once daily., Disp: , Rfl:     dicyclomine (BENTYL) 20 mg tablet, Four times a day, Disp: , Rfl:     FREESTYLE LANCETS 28 gauge lancets, , Disp: , Rfl: 5    GREEN TEA EXTRACT ORAL, Take 1 " tablet by mouth once daily., Disp: , Rfl:     hydrochlorothiazide (HYDRODIURIL) 25 MG tablet, Take 25 mg by mouth once daily., Disp: , Rfl: 3    hydrocodone-acetaminophen 10-325mg (NORCO)  mg Tab, 1/2 to 1 tablet PO daily prn pain., Disp: 30 tablet, Rfl: 0    LACTOBACILLUS ACIDOPHILUS (PROBIOTIC ORAL), Take 1 capsule by mouth once daily., Disp: , Rfl:     metformin (GLUCOPHAGE) 1000 MG tablet, Take 1,000 mg by mouth 2 (two) times daily with meals. , Disp: , Rfl:     olive leaf extract 250 mg Cap, Take 1 capsule by mouth once daily., Disp: , Rfl:     OMEGA-3 FATTY ACIDS/FISH OIL (OMEGA 3 FISH OIL ORAL), No Sig Provided, Disp: , Rfl:     ONE TOUCH ULTRA TEST Strp, , Disp: , Rfl: 5    pantoprazole (PROTONIX) 40 MG tablet, Take 40 mg by mouth once daily. , Disp: , Rfl: 9    resveratrol 250 mg Cap, Take 1 capsule by mouth once daily., Disp: , Rfl:           Objective:   Vitals:  Blood pressure 124/69, pulse 89, temperature 98.6 °F (37 °C), resp. rate 18.    Physical Examination:   GEN: no apparent distress, comfortable  HEAD: atraumatic and normocephalic  EYES: no pallor, no icterus  ENT:  no pharyngeal erythema, external ears WNL; no nasal discharge; no thrush  NECK: no masses, thyroid normal, trachea midline, no LAD/LN's, supple  CV: RRR with no murmur; normal pulse; normal S1 and S2  CHEST: Normal respiratory effort; CTAB; normal breath sounds; no wheeze or crackles  ABDOM: Abdomen is full and firm. I do not palpate ascites. The abdomen is nontender. I do not palpate enlargement of the liver, spleen, or abdominal mass.  MUSC/Skeletal: ROM normal; no crepitus; joints normal  EXTREM: no clubbing, cyanosis, inflammation or swelling  SKIN: no rashes, lesions, ulcers, petechiae   : no hickman  NEURO: grossly intact; motor/sensory WNL; no tremors  PSYCH: normal mood, affect and behavior  LYMPH: normal cervical, supraclavicular, axillary and groin LN's      Labs:    CMP    I have reviewed all available lab  results and radiology reports.    Radiology/Diagnostic Studies:  Hemoglobin is 14.6, white blood count is 15,000, white count is 781,,000.    MRI of brain in October 2016 showed microvascular disease  Assessment:   (1) 78 y.o. female with diagnosis of Myeloproliferative syndrome, essential thrombocythemia being managed with aspirin 81 mg by mouth daily. She will have CBC done q 2 months.    She does not agree to take Hydrea or Agrylin for control of the increased platelet count. I expect that aspirin will be effective here in reducing her risk of CVA event.    She is starting a herbal/ vitamen suppliment for control of platelet count.    Moving to [place].

## 2018-05-22 NOTE — LETTER
May 22, 2018      Adriana Izaguirre MD  [address]  [place] [address]           [facility]  [address] [facility]  [address]  [place] [address]  Phone: [phone]  Fax: [phone]          Patient: Jonny Martinez   MR Number: [id number]   YOB: 1940   Date of Visit: 5/22/2018       Dear Dr. Adriana Izaguirre:    Thank you for referring Jonny Martinez to me for evaluation. Attached you will find relevant portions of my assessment and plan of care.    If you have questions, please do not hesitate to call me. I look forward to following Jonny Martinez along with you.    Sincerely,    BRANDY Mcdermott MD    Enclosure  CC:  No Recipients    If you would like to receive this communication electronically, please contact [email or web address] or [phone] to request more information on [employer] Link access.    For providers and/or their staff who would like to refer a patient to [facility], please contact us through our one-stop-shop provider referral line, [facility], at [phone].    If you feel you have received this communication in error or would no longer like to receive these types of communications, please e-mail [email or web address]

## 2018-09-11 ENCOUNTER — PATIENT OUTREACH (OUTPATIENT)
Dept: ADMINISTRATIVE | Facility: HOSPITAL | Age: 78
End: 2018-09-11

## 2018-09-11 ENCOUNTER — DOCUMENTATION ONLY (OUTPATIENT)
Dept: FAMILY MEDICINE | Facility: CLINIC | Age: 78
End: 2018-09-11

## 2018-09-11 NOTE — PROGRESS NOTES
Pre-Visit Chart Review  For Appointment Scheduled on 9/12/2018    Health Maintenance Due   Topic Date Due    Mammogram  11/18/2017    Eye Exam  03/15/2018    Influenza Vaccine  08/01/2018    Foot Exam  08/07/2018

## 2018-09-12 ENCOUNTER — TELEPHONE (OUTPATIENT)
Dept: FAMILY MEDICINE | Facility: CLINIC | Age: 78
End: 2018-09-12

## 2018-09-12 NOTE — TELEPHONE ENCOUNTER
Called home number---was advised by person who answered that they just got that number 2 weeks ago, this number no longer belongs to Ms. Martinez. Will send My Chart message to pt to reschedule since we cannot call her. She missed her 60 minute appt today with Dr. Izaguirre. Thanks, [place]

## 2018-09-13 ENCOUNTER — PES CALL (OUTPATIENT)
Dept: ADMINISTRATIVE | Facility: CLINIC | Age: 78
End: 2018-09-13

## 2020-10-01 ENCOUNTER — PATIENT MESSAGE (OUTPATIENT)
Dept: OTHER | Facility: OTHER | Age: 80
End: 2020-10-01